# Patient Record
Sex: MALE | Race: WHITE | NOT HISPANIC OR LATINO | Employment: OTHER | URBAN - NONMETROPOLITAN AREA
[De-identification: names, ages, dates, MRNs, and addresses within clinical notes are randomized per-mention and may not be internally consistent; named-entity substitution may affect disease eponyms.]

---

## 2017-04-18 ENCOUNTER — APPOINTMENT (EMERGENCY)
Dept: RADIOLOGY | Facility: HOSPITAL | Age: 62
End: 2017-04-18
Payer: COMMERCIAL

## 2017-04-18 ENCOUNTER — HOSPITAL ENCOUNTER (EMERGENCY)
Facility: HOSPITAL | Age: 62
End: 2017-04-18
Attending: EMERGENCY MEDICINE | Admitting: EMERGENCY MEDICINE
Payer: COMMERCIAL

## 2017-04-18 ENCOUNTER — HOSPITAL ENCOUNTER (INPATIENT)
Facility: HOSPITAL | Age: 62
LOS: 2 days | Discharge: HOME | DRG: 101 | End: 2017-04-20
Attending: INTERNAL MEDICINE | Admitting: INTERNAL MEDICINE
Payer: COMMERCIAL

## 2017-04-18 ENCOUNTER — APPOINTMENT (EMERGENCY)
Dept: CT IMAGING | Facility: HOSPITAL | Age: 62
End: 2017-04-18
Payer: COMMERCIAL

## 2017-04-18 VITALS
DIASTOLIC BLOOD PRESSURE: 83 MMHG | TEMPERATURE: 100.3 F | RESPIRATION RATE: 18 BRPM | OXYGEN SATURATION: 97 % | WEIGHT: 241.18 LBS | HEART RATE: 82 BPM | SYSTOLIC BLOOD PRESSURE: 115 MMHG

## 2017-04-18 DIAGNOSIS — F10.10 ALCOHOL ABUSE: ICD-10-CM

## 2017-04-18 DIAGNOSIS — R56.9 SEIZURE (HCC): Primary | ICD-10-CM

## 2017-04-18 LAB
ALBUMIN SERPL BCP-MCNC: 3.8 G/DL (ref 3.5–5)
ALP SERPL-CCNC: 89 U/L (ref 46–116)
ALT SERPL W P-5'-P-CCNC: 40 U/L (ref 12–78)
AMPHETAMINES SERPL QL SCN: NEGATIVE
ANION GAP BLD CALC-SCNC: 18 MMOL/L (ref 4–13)
APAP SERPL-MCNC: <2 UG/ML (ref 10–30)
AST SERPL W P-5'-P-CCNC: 36 U/L (ref 5–45)
BACTERIA UR QL AUTO: ABNORMAL /HPF
BARBITURATES UR QL: NEGATIVE
BASE EX.OXY STD BLDV CALC-SCNC: 84.7 % (ref 60–80)
BASE EXCESS BLDV CALC-SCNC: -1.1 MMOL/L
BASOPHILS # BLD AUTO: 0.03 THOUSANDS/ΜL (ref 0–0.1)
BASOPHILS NFR BLD AUTO: 0 % (ref 0–1)
BENZODIAZ UR QL: POSITIVE
BILIRUB DIRECT SERPL-MCNC: 0.11 MG/DL (ref 0–0.2)
BILIRUB SERPL-MCNC: 0.53 MG/DL (ref 0.2–1)
BILIRUB UR QL STRIP: NEGATIVE
BUN BLD-MCNC: 9 MG/DL (ref 5–25)
CA-I BLD-SCNC: 1.11 MMOL/L (ref 1.12–1.32)
CHLORIDE BLD-SCNC: 104 MMOL/L (ref 100–108)
CLARITY UR: CLEAR
COCAINE UR QL: NEGATIVE
COLOR UR: YELLOW
CREAT BLD-MCNC: 0.9 MG/DL (ref 0.6–1.3)
EOSINOPHIL # BLD AUTO: 0.03 THOUSAND/ΜL (ref 0–0.61)
EOSINOPHIL NFR BLD AUTO: 0 % (ref 0–6)
ERYTHROCYTE [DISTWIDTH] IN BLOOD BY AUTOMATED COUNT: 13.7 % (ref 11.6–15.1)
ETHANOL SERPL-MCNC: <3 MG/DL (ref 0–3)
GFR SERPL CREATININE-BSD FRML MDRD: >60 ML/MIN/1.73SQ M
GLUCOSE SERPL-MCNC: 119 MG/DL (ref 65–140)
GLUCOSE UR STRIP-MCNC: NEGATIVE MG/DL
HCO3 BLDV-SCNC: 24.2 MMOL/L (ref 24–30)
HCT VFR BLD AUTO: 46.1 % (ref 36.5–49.3)
HCT VFR BLD CALC: 46 % (ref 36.5–49.3)
HGB BLD-MCNC: 15.5 G/DL (ref 12–17)
HGB BLDA-MCNC: 15.6 G/DL (ref 12–17)
HGB UR QL STRIP.AUTO: ABNORMAL
HOLD SPECIMEN: NORMAL
HOLD SPECIMEN: NORMAL
HYALINE CASTS #/AREA URNS LPF: ABNORMAL /LPF
INR PPP: 1.11 (ref 0.86–1.16)
KETONES UR STRIP-MCNC: NEGATIVE MG/DL
LEUKOCYTE ESTERASE UR QL STRIP: NEGATIVE
LIPASE SERPL-CCNC: 205 U/L (ref 73–393)
LYMPHOCYTES # BLD AUTO: 1.03 THOUSANDS/ΜL (ref 0.6–4.47)
LYMPHOCYTES NFR BLD AUTO: 12 % (ref 14–44)
Lab: NORMAL
MAGNESIUM SERPL-MCNC: 2.1 MG/DL (ref 1.6–2.6)
MCH RBC QN AUTO: 29.8 PG (ref 26.8–34.3)
MCHC RBC AUTO-ENTMCNC: 33.6 G/DL (ref 31.4–37.4)
MCV RBC AUTO: 89 FL (ref 82–98)
METHADONE UR QL: NEGATIVE
MONOCYTES # BLD AUTO: 0.66 THOUSAND/ΜL (ref 0.17–1.22)
MONOCYTES NFR BLD AUTO: 8 % (ref 4–12)
NEUTROPHILS # BLD AUTO: 6.53 THOUSANDS/ΜL (ref 1.85–7.62)
NEUTS SEG NFR BLD AUTO: 80 % (ref 43–75)
NITRITE UR QL STRIP: NEGATIVE
NON-SQ EPI CELLS URNS QL MICRO: ABNORMAL /HPF
O2 CT BLDV-SCNC: 18.8 ML/DL
OPIATES UR QL SCN: NEGATIVE
PCO2 BLD: 22 MMOL/L (ref 21–32)
PCO2 BLDV: 42.3 MM HG (ref 42–50)
PCP UR QL: NEGATIVE
PH BLDV: 7.38 [PH] (ref 7.3–7.4)
PH UR STRIP.AUTO: 6.5 [PH] (ref 4.5–8)
PLATELET # BLD AUTO: 344 THOUSANDS/UL (ref 149–390)
PMV BLD AUTO: 8.8 FL (ref 8.9–12.7)
PO2 BLDV: 50.7 MM HG (ref 35–45)
POTASSIUM BLD-SCNC: 4.1 MMOL/L (ref 3.5–5.3)
PROT SERPL-MCNC: 7 G/DL (ref 6.4–8.2)
PROT UR STRIP-MCNC: ABNORMAL MG/DL
PROTHROMBIN TIME: 14 SECONDS (ref 12–14.3)
RBC # BLD AUTO: 5.21 MILLION/UL (ref 3.88–5.62)
RBC #/AREA URNS AUTO: ABNORMAL /HPF
SALICYLATES SERPL-MCNC: <3 MG/DL (ref 3–20)
SODIUM BLD-SCNC: 138 MMOL/L (ref 136–145)
SP GR UR STRIP.AUTO: 1.01 (ref 1–1.03)
SPECIMEN SOURCE: ABNORMAL
T4 FREE SERPL-MCNC: 0.74 NG/DL (ref 0.76–1.46)
THC UR QL: NEGATIVE
TSH SERPL DL<=0.05 MIU/L-ACNC: 0.71 UIU/ML (ref 0.36–3.74)
UROBILINOGEN UR QL STRIP.AUTO: 0.2 E.U./DL
WBC # BLD AUTO: 8.28 THOUSAND/UL (ref 4.31–10.16)
WBC #/AREA URNS AUTO: ABNORMAL /HPF

## 2017-04-18 PROCEDURE — 96366 THER/PROPH/DIAG IV INF ADDON: CPT

## 2017-04-18 PROCEDURE — 72125 CT NECK SPINE W/O DYE: CPT

## 2017-04-18 PROCEDURE — 84443 ASSAY THYROID STIM HORMONE: CPT | Performed by: EMERGENCY MEDICINE

## 2017-04-18 PROCEDURE — 82805 BLOOD GASES W/O2 SATURATION: CPT | Performed by: EMERGENCY MEDICINE

## 2017-04-18 PROCEDURE — 73140 X-RAY EXAM OF FINGER(S): CPT

## 2017-04-18 PROCEDURE — 71260 CT THORAX DX C+: CPT

## 2017-04-18 PROCEDURE — 83690 ASSAY OF LIPASE: CPT | Performed by: EMERGENCY MEDICINE

## 2017-04-18 PROCEDURE — 80329 ANALGESICS NON-OPIOID 1 OR 2: CPT | Performed by: EMERGENCY MEDICINE

## 2017-04-18 PROCEDURE — 70450 CT HEAD/BRAIN W/O DYE: CPT

## 2017-04-18 PROCEDURE — 90715 TDAP VACCINE 7 YRS/> IM: CPT | Performed by: EMERGENCY MEDICINE

## 2017-04-18 PROCEDURE — 90471 IMMUNIZATION ADMIN: CPT

## 2017-04-18 PROCEDURE — 85025 COMPLETE CBC W/AUTO DIFF WBC: CPT | Performed by: EMERGENCY MEDICINE

## 2017-04-18 PROCEDURE — 80047 BASIC METABLC PNL IONIZED CA: CPT

## 2017-04-18 PROCEDURE — 96365 THER/PROPH/DIAG IV INF INIT: CPT

## 2017-04-18 PROCEDURE — 80076 HEPATIC FUNCTION PANEL: CPT | Performed by: EMERGENCY MEDICINE

## 2017-04-18 PROCEDURE — 80307 DRUG TEST PRSMV CHEM ANLYZR: CPT | Performed by: EMERGENCY MEDICINE

## 2017-04-18 PROCEDURE — 99285 EMERGENCY DEPT VISIT HI MDM: CPT

## 2017-04-18 PROCEDURE — 83735 ASSAY OF MAGNESIUM: CPT | Performed by: EMERGENCY MEDICINE

## 2017-04-18 PROCEDURE — 85610 PROTHROMBIN TIME: CPT | Performed by: EMERGENCY MEDICINE

## 2017-04-18 PROCEDURE — 80320 DRUG SCREEN QUANTALCOHOLS: CPT | Performed by: EMERGENCY MEDICINE

## 2017-04-18 PROCEDURE — 87086 URINE CULTURE/COLONY COUNT: CPT | Performed by: EMERGENCY MEDICINE

## 2017-04-18 PROCEDURE — 96375 TX/PRO/DX INJ NEW DRUG ADDON: CPT

## 2017-04-18 PROCEDURE — 74177 CT ABD & PELVIS W/CONTRAST: CPT

## 2017-04-18 PROCEDURE — 36415 COLL VENOUS BLD VENIPUNCTURE: CPT | Performed by: EMERGENCY MEDICINE

## 2017-04-18 PROCEDURE — 81001 URINALYSIS AUTO W/SCOPE: CPT | Performed by: EMERGENCY MEDICINE

## 2017-04-18 PROCEDURE — 73080 X-RAY EXAM OF ELBOW: CPT

## 2017-04-18 PROCEDURE — 85014 HEMATOCRIT: CPT

## 2017-04-18 PROCEDURE — 93005 ELECTROCARDIOGRAM TRACING: CPT | Performed by: EMERGENCY MEDICINE

## 2017-04-18 PROCEDURE — 84439 ASSAY OF FREE THYROXINE: CPT | Performed by: EMERGENCY MEDICINE

## 2017-04-18 RX ORDER — LORAZEPAM 2 MG/ML
2 INJECTION INTRAMUSCULAR ONCE
Status: COMPLETED | OUTPATIENT
Start: 2017-04-18 | End: 2017-04-18

## 2017-04-18 RX ORDER — LORAZEPAM 2 MG/ML
INJECTION INTRAMUSCULAR
Status: COMPLETED
Start: 2017-04-18 | End: 2017-04-18

## 2017-04-18 RX ORDER — LEVETIRACETAM 500 MG/5ML
INJECTION, SOLUTION, CONCENTRATE INTRAVENOUS
Status: DISCONTINUED
Start: 2017-04-18 | End: 2017-04-18 | Stop reason: WASHOUT

## 2017-04-18 RX ORDER — DIAZEPAM 5 MG/ML
20 INJECTION, SOLUTION INTRAMUSCULAR; INTRAVENOUS ONCE
Status: COMPLETED | OUTPATIENT
Start: 2017-04-18 | End: 2017-04-18

## 2017-04-18 RX ORDER — CEFTRIAXONE SODIUM 2 G/50ML
2000 INJECTION, SOLUTION INTRAVENOUS ONCE
Status: COMPLETED | OUTPATIENT
Start: 2017-04-18 | End: 2017-04-18

## 2017-04-18 RX ADMIN — LORAZEPAM 2 MG: 2 INJECTION, SOLUTION INTRAMUSCULAR; INTRAVENOUS at 17:23

## 2017-04-18 RX ADMIN — LORAZEPAM 2 MG: 2 INJECTION INTRAMUSCULAR at 17:23

## 2017-04-18 RX ADMIN — IOHEXOL 100 ML: 350 INJECTION, SOLUTION INTRAVENOUS at 16:55

## 2017-04-18 RX ADMIN — CEFTRIAXONE SODIUM 2000 MG: 2 INJECTION, SOLUTION INTRAVENOUS at 17:29

## 2017-04-18 RX ADMIN — LEVETIRACETAM 1000 MG: 500 INJECTION, SOLUTION, CONCENTRATE INTRAVENOUS at 18:02

## 2017-04-18 RX ADMIN — TETANUS TOXOID, REDUCED DIPHTHERIA TOXOID AND ACELLULAR PERTUSSIS VACCINE, ADSORBED 0.5 ML: 5; 2.5; 8; 8; 2.5 SUSPENSION INTRAMUSCULAR at 17:17

## 2017-04-18 RX ADMIN — DIAZEPAM 20 MG: 5 INJECTION, SOLUTION INTRAMUSCULAR; INTRAVENOUS at 17:54

## 2017-04-18 RX ADMIN — ACYCLOVIR SODIUM 1100 MG: 50 INJECTION, SOLUTION INTRAVENOUS at 18:46

## 2017-04-19 ENCOUNTER — GENERIC CONVERSION - ENCOUNTER (OUTPATIENT)
Dept: OTHER | Facility: OTHER | Age: 62
End: 2017-04-19

## 2017-04-19 ENCOUNTER — APPOINTMENT (INPATIENT)
Dept: RADIOLOGY | Facility: HOSPITAL | Age: 62
DRG: 101 | End: 2017-04-19
Payer: COMMERCIAL

## 2017-04-19 ENCOUNTER — APPOINTMENT (INPATIENT)
Dept: NEUROLOGY | Facility: AMBULATORY SURGERY CENTER | Age: 62
DRG: 101 | End: 2017-04-19
Payer: COMMERCIAL

## 2017-04-19 PROBLEM — R51.9 ACUTE NONINTRACTABLE HEADACHE: Status: ACTIVE | Noted: 2017-04-19

## 2017-04-19 PROBLEM — M25.512 ACUTE PAIN OF LEFT SHOULDER: Status: ACTIVE | Noted: 2017-04-19

## 2017-04-19 PROBLEM — R56.9 SEIZURE (HCC): Status: ACTIVE | Noted: 2017-04-19

## 2017-04-19 PROBLEM — F10.10 ALCOHOL ABUSE: Status: ACTIVE | Noted: 2017-04-19
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
## 2017-04-19 LAB
ANION GAP SERPL CALCULATED.3IONS-SCNC: 9 MMOL/L (ref 4–13)
BUN SERPL-MCNC: 9 MG/DL (ref 5–25)
CALCIUM SERPL-MCNC: 8 MG/DL (ref 8.3–10.1)
CHLORIDE SERPL-SCNC: 105 MMOL/L (ref 100–108)
CO2 SERPL-SCNC: 24 MMOL/L (ref 21–32)
CREAT SERPL-MCNC: 0.88 MG/DL (ref 0.6–1.3)
ERYTHROCYTE [DISTWIDTH] IN BLOOD BY AUTOMATED COUNT: 13.8 % (ref 11.6–15.1)
GFR SERPL CREATININE-BSD FRML MDRD: >60 ML/MIN/1.73SQ M
GLUCOSE SERPL-MCNC: 99 MG/DL (ref 65–140)
HCT VFR BLD AUTO: 42.8 % (ref 36.5–49.3)
HGB BLD-MCNC: 14.5 G/DL (ref 12–17)
MCH RBC QN AUTO: 30.1 PG (ref 26.8–34.3)
MCHC RBC AUTO-ENTMCNC: 33.9 G/DL (ref 31.4–37.4)
MCV RBC AUTO: 89 FL (ref 82–98)
PLATELET # BLD AUTO: 307 THOUSANDS/UL (ref 149–390)
PMV BLD AUTO: 9.4 FL (ref 8.9–12.7)
POTASSIUM SERPL-SCNC: 3.5 MMOL/L (ref 3.5–5.3)
RBC # BLD AUTO: 4.82 MILLION/UL (ref 3.88–5.62)
SODIUM SERPL-SCNC: 138 MMOL/L (ref 136–145)
WBC # BLD AUTO: 8.98 THOUSAND/UL (ref 4.31–10.16)

## 2017-04-19 PROCEDURE — 70553 MRI BRAIN STEM W/O & W/DYE: CPT

## 2017-04-19 PROCEDURE — 80048 BASIC METABOLIC PNL TOTAL CA: CPT | Performed by: INTERNAL MEDICINE

## 2017-04-19 PROCEDURE — 95816 EEG AWAKE AND DROWSY: CPT

## 2017-04-19 PROCEDURE — 85027 COMPLETE CBC AUTOMATED: CPT | Performed by: INTERNAL MEDICINE

## 2017-04-19 PROCEDURE — A9585 GADOBUTROL INJECTION: HCPCS | Performed by: INTERNAL MEDICINE

## 2017-04-19 PROCEDURE — 73030 X-RAY EXAM OF SHOULDER: CPT

## 2017-04-19 RX ORDER — OXYCODONE HYDROCHLORIDE 5 MG/1
5 TABLET ORAL EVERY 4 HOURS PRN
Status: DISCONTINUED | OUTPATIENT
Start: 2017-04-19 | End: 2017-04-20 | Stop reason: HOSPADM

## 2017-04-19 RX ORDER — FOLIC ACID 1 MG/1
1 TABLET ORAL DAILY
Status: DISCONTINUED | OUTPATIENT
Start: 2017-04-19 | End: 2017-04-20 | Stop reason: HOSPADM

## 2017-04-19 RX ORDER — LORAZEPAM 2 MG/ML
1 INJECTION INTRAMUSCULAR EVERY 4 HOURS PRN
Status: DISCONTINUED | OUTPATIENT
Start: 2017-04-19 | End: 2017-04-20 | Stop reason: HOSPADM

## 2017-04-19 RX ORDER — THIAMINE MONONITRATE (VIT B1) 100 MG
100 TABLET ORAL DAILY
Status: DISCONTINUED | OUTPATIENT
Start: 2017-04-19 | End: 2017-04-20 | Stop reason: HOSPADM

## 2017-04-19 RX ORDER — ONDANSETRON 2 MG/ML
4 INJECTION INTRAMUSCULAR; INTRAVENOUS EVERY 6 HOURS PRN
Status: DISCONTINUED | OUTPATIENT
Start: 2017-04-19 | End: 2017-04-20 | Stop reason: HOSPADM

## 2017-04-19 RX ADMIN — OXYCODONE HYDROCHLORIDE 5 MG: 5 TABLET ORAL at 01:49

## 2017-04-19 RX ADMIN — FOLIC ACID 1 MG: 1 TABLET ORAL at 08:32

## 2017-04-19 RX ADMIN — ENOXAPARIN SODIUM 40 MG: 40 INJECTION SUBCUTANEOUS at 08:32

## 2017-04-19 RX ADMIN — OXYCODONE HYDROCHLORIDE 5 MG: 5 TABLET ORAL at 23:43

## 2017-04-19 RX ADMIN — OXYCODONE HYDROCHLORIDE 5 MG: 5 TABLET ORAL at 16:55

## 2017-04-19 RX ADMIN — OXYCODONE HYDROCHLORIDE 5 MG: 5 TABLET ORAL at 08:32

## 2017-04-19 RX ADMIN — Medication 100 MG: at 08:33

## 2017-04-19 RX ADMIN — GADOBUTROL 10 ML: 604.72 INJECTION INTRAVENOUS at 23:06

## 2017-04-20 VITALS
OXYGEN SATURATION: 99 % | SYSTOLIC BLOOD PRESSURE: 150 MMHG | DIASTOLIC BLOOD PRESSURE: 69 MMHG | BODY MASS INDEX: 32.67 KG/M2 | WEIGHT: 241.18 LBS | HEART RATE: 82 BPM | HEIGHT: 72 IN | RESPIRATION RATE: 20 BRPM | TEMPERATURE: 97.3 F

## 2017-04-20 PROBLEM — R51.9 ACUTE NONINTRACTABLE HEADACHE: Status: RESOLVED | Noted: 2017-04-19 | Resolved: 2017-04-20

## 2017-04-20 LAB
ATRIAL RATE: 90 BPM
BACTERIA UR CULT: NORMAL
P AXIS: 34 DEGREES
PR INTERVAL: 160 MS
QRS AXIS: 4 DEGREES
QRSD INTERVAL: 92 MS
QT INTERVAL: 376 MS
QTC INTERVAL: 459 MS
T WAVE AXIS: 40 DEGREES
VENTRICULAR RATE: 90 BPM

## 2017-04-20 RX ADMIN — Medication 100 MG: at 09:14

## 2017-04-20 RX ADMIN — FOLIC ACID 1 MG: 1 TABLET ORAL at 09:14

## 2017-04-20 RX ADMIN — ENOXAPARIN SODIUM 40 MG: 40 INJECTION SUBCUTANEOUS at 09:14

## 2017-04-27 ENCOUNTER — ALLSCRIPTS OFFICE VISIT (OUTPATIENT)
Dept: OTHER | Facility: OTHER | Age: 62
End: 2017-04-27

## 2017-04-27 DIAGNOSIS — G40.909 EPILEPSY WITHOUT STATUS EPILEPTICUS, NOT INTRACTABLE (HCC): ICD-10-CM

## 2017-05-03 ENCOUNTER — APPOINTMENT (OUTPATIENT)
Dept: LAB | Facility: MEDICAL CENTER | Age: 62
End: 2017-05-03
Payer: COMMERCIAL

## 2017-05-03 ENCOUNTER — TRANSCRIBE ORDERS (OUTPATIENT)
Dept: LAB | Facility: MEDICAL CENTER | Age: 62
End: 2017-05-03

## 2017-05-03 DIAGNOSIS — G40.909 EPILEPSY WITHOUT STATUS EPILEPTICUS, NOT INTRACTABLE (HCC): ICD-10-CM

## 2017-05-03 LAB
ANION GAP SERPL CALCULATED.3IONS-SCNC: 9 MMOL/L (ref 4–13)
BUN SERPL-MCNC: 11 MG/DL (ref 5–25)
CALCIUM SERPL-MCNC: 9.3 MG/DL (ref 8.3–10.1)
CHLORIDE SERPL-SCNC: 104 MMOL/L (ref 100–108)
CO2 SERPL-SCNC: 24 MMOL/L (ref 21–32)
CREAT SERPL-MCNC: 0.96 MG/DL (ref 0.6–1.3)
GFR SERPL CREATININE-BSD FRML MDRD: >60 ML/MIN/1.73SQ M
GLUCOSE P FAST SERPL-MCNC: 83 MG/DL (ref 65–99)
MAGNESIUM SERPL-MCNC: 2.4 MG/DL (ref 1.6–2.6)
POTASSIUM SERPL-SCNC: 4 MMOL/L (ref 3.5–5.3)
SODIUM SERPL-SCNC: 137 MMOL/L (ref 136–145)

## 2017-05-03 PROCEDURE — 80048 BASIC METABOLIC PNL TOTAL CA: CPT

## 2017-05-03 PROCEDURE — 36415 COLL VENOUS BLD VENIPUNCTURE: CPT

## 2017-05-03 PROCEDURE — 83735 ASSAY OF MAGNESIUM: CPT

## 2017-05-05 ENCOUNTER — GENERIC CONVERSION - ENCOUNTER (OUTPATIENT)
Dept: OTHER | Facility: OTHER | Age: 62
End: 2017-05-05

## 2017-05-11 ENCOUNTER — ALLSCRIPTS OFFICE VISIT (OUTPATIENT)
Dept: OTHER | Facility: OTHER | Age: 62
End: 2017-05-11

## 2017-05-19 ENCOUNTER — TRANSCRIBE ORDERS (OUTPATIENT)
Dept: LAB | Facility: MEDICAL CENTER | Age: 62
End: 2017-05-19

## 2017-05-19 ENCOUNTER — APPOINTMENT (OUTPATIENT)
Dept: LAB | Facility: MEDICAL CENTER | Age: 62
End: 2017-05-19
Payer: COMMERCIAL

## 2017-05-19 DIAGNOSIS — G40.909 EPILEPSY WITHOUT STATUS EPILEPTICUS, NOT INTRACTABLE (HCC): ICD-10-CM

## 2017-05-19 LAB
ANION GAP SERPL CALCULATED.3IONS-SCNC: 6 MMOL/L (ref 4–13)
BUN SERPL-MCNC: 11 MG/DL (ref 5–25)
CALCIUM SERPL-MCNC: 8.8 MG/DL (ref 8.3–10.1)
CHLORIDE SERPL-SCNC: 106 MMOL/L (ref 100–108)
CO2 SERPL-SCNC: 24 MMOL/L (ref 21–32)
CREAT SERPL-MCNC: 0.84 MG/DL (ref 0.6–1.3)
GFR SERPL CREATININE-BSD FRML MDRD: >60 ML/MIN/1.73SQ M
GLUCOSE P FAST SERPL-MCNC: 92 MG/DL (ref 65–99)
POTASSIUM SERPL-SCNC: 3.9 MMOL/L (ref 3.5–5.3)
SODIUM SERPL-SCNC: 136 MMOL/L (ref 136–145)

## 2017-05-19 PROCEDURE — 80177 DRUG SCRN QUAN LEVETIRACETAM: CPT

## 2017-05-19 PROCEDURE — 80048 BASIC METABOLIC PNL TOTAL CA: CPT

## 2017-05-19 PROCEDURE — 36415 COLL VENOUS BLD VENIPUNCTURE: CPT

## 2017-05-22 ENCOUNTER — GENERIC CONVERSION - ENCOUNTER (OUTPATIENT)
Dept: OTHER | Facility: OTHER | Age: 62
End: 2017-05-22

## 2017-05-22 LAB — LEVETIRACETAM SERPL-MCNC: 5.3 UG/ML (ref 10–40)

## 2017-05-31 ENCOUNTER — TRANSCRIBE ORDERS (OUTPATIENT)
Dept: ADMINISTRATIVE | Facility: HOSPITAL | Age: 62
End: 2017-05-31

## 2017-05-31 ENCOUNTER — ALLSCRIPTS OFFICE VISIT (OUTPATIENT)
Dept: OTHER | Facility: OTHER | Age: 62
End: 2017-05-31

## 2017-05-31 DIAGNOSIS — G40.909 NONINTRACTABLE EPILEPSY WITHOUT STATUS EPILEPTICUS, UNSPECIFIED EPILEPSY TYPE (HCC): Primary | ICD-10-CM

## 2017-06-14 ENCOUNTER — HOSPITAL ENCOUNTER (OUTPATIENT)
Dept: NEUROLOGY | Facility: HOSPITAL | Age: 62
Discharge: HOME | End: 2017-06-14
Payer: COMMERCIAL

## 2017-06-14 ENCOUNTER — GENERIC CONVERSION - ENCOUNTER (OUTPATIENT)
Dept: OTHER | Facility: OTHER | Age: 62
End: 2017-06-14

## 2017-06-14 DIAGNOSIS — G40.909 NONINTRACTABLE EPILEPSY WITHOUT STATUS EPILEPTICUS, UNSPECIFIED EPILEPSY TYPE (HCC): ICD-10-CM

## 2017-06-14 PROCEDURE — 95819 EEG AWAKE AND ASLEEP: CPT

## 2017-09-12 ENCOUNTER — ALLSCRIPTS OFFICE VISIT (OUTPATIENT)
Dept: OTHER | Facility: OTHER | Age: 62
End: 2017-09-12
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
## 2018-01-12 VITALS
BODY MASS INDEX: 31.32 KG/M2 | HEIGHT: 74 IN | HEART RATE: 84 BPM | SYSTOLIC BLOOD PRESSURE: 186 MMHG | WEIGHT: 244 LBS | OXYGEN SATURATION: 98 % | RESPIRATION RATE: 19 BRPM | DIASTOLIC BLOOD PRESSURE: 106 MMHG

## 2018-01-12 NOTE — RESULT NOTES
Verified Results  (1) BASIC METABOLIC PROFILE [id number] 10:35AM Lashae Mensah Order Number: [id number]     Test Name Result Flag Reference   SODIUM 136 mmol/L  136-145   POTASSIUM 3 9 mmol/L  3 5-5 3   CHLORIDE 106 mmol/L  100-108   CARBON DIOXIDE 24 mmol/L  21-32   ANION GAP (CALC) 6 mmol/L  4-13   BLOOD UREA NITROGEN 11 mg/dL  5-25   CREATININE 0 84 mg/dL  0 60-1 30   Standardized to IDMS reference method   CALCIUM 8 8 mg/dL  8 3-10 1   eGFR Non-African American      >60 0 ml/min/1 73sq [facility] Energy Disease Education Program recommendations are as follows:  GFR calculation is accurate only with a steady state creatinine  Chronic Kidney disease less than 60 ml/min/1 73 sq  meters  Kidney failure less than 15 ml/min/1 73 sq  meters     GLUCOSE FASTING 92 mg/dL  65-99

## 2018-01-12 NOTE — PROCEDURES
Procedures by Queenie Rosenbaum MD at 2017   2:19 PM      Author:  Queenie Rosenbaum MD Service:  Neurology Author Type:  Physician    Filed:  2017  2:24 PM Date of Service:  2017  2:19 PM Status:  Signed    :  Queenie Rosenbaum MD (Physician)         ELECTROENCEPHALOGRAM (EEG)      Patient Name:  Jareth Arauz  MRN: [id number]   :  1955 File #: Homero 16 - 12   Age: 62 y o  Encounter #: [id number]   Date performed: 2017            Report date: 2017          Study type: Routine, sleep deprived EEG    ICD 10 diagnosis: Spells/Fit NOS R56 9    Start time: 09:07 End time: 09:42     -------------------------------------------------------------------------------------------------------------------   Patient History: This recording was observed in a 62 y o  male with a single seizure to determine risk of epilepsy  Medications include: Keppra    -------------------------------------------------------------------------------------------------------------------   Description of Procedure:  ·  32 channel digital recording with electrodes placed according to the International 10-20 system with additional  T1/T2 electrodes, EOG, EKG, and simultaneous  video  The recording was technically satisfactory  -------------------------------------------------------------------------------------------------------------------   EEG Description:    The recording was performed with the patient awake, drowsy, and asleep  He was fully oriented  During wakefulness, there were brief runs of well regulated, low amplitude, posteriorly  dominant, symmetric 12 cps alpha rhythm that was not sustained long enough to determine reactivity to eye opening  There were symmetric low  amplitude, frontally dominant beta activities  With drowsiness, alpha activity attenuated and was replaced by diffusely distributed theta activities  With sleep, symmetric vertex sharp waves and sleep spindles were present  -------------------------------------------------------------------------------------------------------------------   Activation Procedures:  Hyperventilation was performed for 3-4  minutes and did not produce any changes  Stepped photic stimulation between 1-20 cps was performed and induced symmetric  photic driving  Other findings: The single lead ECG demonstrated normal sinus rhythm    -------------------------------------------------------------------------------------------------------------------   EEG Interpretation: This Routine, sleep deprived EEG recorded during wakefulness, drowsiness, and sleep is normal     Connie Tesfaye MD   [address] [facility]               Received for:Gen LUBIN    Jun 14 2017  2:24PM [facility] Standard Time
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
## 2018-01-13 VITALS
OXYGEN SATURATION: 96 % | SYSTOLIC BLOOD PRESSURE: 168 MMHG | DIASTOLIC BLOOD PRESSURE: 86 MMHG | WEIGHT: 240.75 LBS | RESPIRATION RATE: 18 BRPM | BODY MASS INDEX: 30.91 KG/M2 | HEART RATE: 75 BPM

## 2018-01-14 VITALS
WEIGHT: 243 LBS | HEIGHT: 74 IN | BODY MASS INDEX: 31.18 KG/M2 | SYSTOLIC BLOOD PRESSURE: 136 MMHG | DIASTOLIC BLOOD PRESSURE: 88 MMHG

## 2018-01-14 VITALS
SYSTOLIC BLOOD PRESSURE: 138 MMHG | BODY MASS INDEX: 31.44 KG/M2 | WEIGHT: 245 LBS | HEIGHT: 74 IN | DIASTOLIC BLOOD PRESSURE: 106 MMHG

## 2018-01-15 NOTE — RESULT NOTES
Verified Results  (1) MAGNESIUM 52EJG4559 10:47AM Therese Coughlin     Test Name Result Flag Reference   MAGNESIUM 2 4 mg/dL  1 6-2 6     (1) BASIC METABOLIC PROFILE 10LPW1636 10:47AM Therese Coughlin     Test Name Result Flag Reference   SODIUM 137 mmol/L  136-145   POTASSIUM 4 0 mmol/L  3 5-5 3   CHLORIDE 104 mmol/L  100-108   CARBON DIOXIDE 24 mmol/L  21-32   ANION GAP (CALC) 9 mmol/L  4-13   BLOOD UREA NITROGEN 11 mg/dL  5-25   CREATININE 0 96 mg/dL  0 60-1 30   Standardized to IDMS reference method   CALCIUM 9 3 mg/dL  8 3-10 1   eGFR Non-African American      >60 0 ml/min/1 73sq [facility] Energy Disease Education Program recommendations are as follows:  GFR calculation is accurate only with a steady state creatinine  Chronic Kidney disease less than 60 ml/min/1 73 sq  meters  Kidney failure less than 15 ml/min/1 73 sq  meters     GLUCOSE FASTING 83 mg/dL  65-99
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
## 2018-01-18 NOTE — RESULT NOTES
Verified Results  (1) LEVETIRACETAM [facility]) 20KIX9450 10:35AM Edgardo Commander Order Number: [id number]     Test Name Result Flag Reference   LEVETIRACETAM (KEPPRA) 5 3 ug/mL L 10 0 - 40 0   Performed at:  [address]  [id number]  : Vale Falk MD, Phone:  [phone]

## 2018-01-30 ENCOUNTER — TELEPHONE (OUTPATIENT)
Dept: FAMILY MEDICINE CLINIC | Facility: CLINIC | Age: 63
End: 2018-01-30

## 2018-02-12 ENCOUNTER — OFFICE VISIT (OUTPATIENT)
Dept: NEUROLOGY | Facility: CLINIC | Age: 63
End: 2018-02-12
Payer: COMMERCIAL

## 2018-02-12 VITALS
BODY MASS INDEX: 32.74 KG/M2 | HEIGHT: 73 IN | SYSTOLIC BLOOD PRESSURE: 151 MMHG | HEART RATE: 81 BPM | DIASTOLIC BLOOD PRESSURE: 92 MMHG | RESPIRATION RATE: 18 BRPM | WEIGHT: 247 LBS

## 2018-02-12 DIAGNOSIS — R56.9 SEIZURE (HCC): Primary | ICD-10-CM

## 2018-02-12 PROCEDURE — 99214 OFFICE O/P EST MOD 30 MIN: CPT | Performed by: PSYCHIATRY & NEUROLOGY

## 2018-02-12 RX ORDER — LEVETIRACETAM 500 MG/1
500 TABLET ORAL 2 TIMES DAILY
Qty: 60 TABLET | Refills: 11 | Status: SHIPPED | OUTPATIENT
Start: 2018-02-12 | End: 2018-12-10 | Stop reason: SDUPTHER

## 2018-02-12 RX ORDER — LEVETIRACETAM 500 MG/1
1 TABLET ORAL 2 TIMES DAILY
COMMUNITY
Start: 2017-05-04 | End: 2018-02-12 | Stop reason: SDUPTHER

## 2018-02-12 NOTE — PROGRESS NOTES
Patient ID: Mariana Newell is a 65 y o  male with hypertension, hyperlipidemia, and a single seizure who is returning to Neurology office for follow up of his seizure  Assessment/Plan:    Seizure ([address] )  He continues to tolerate Keppra well without side effects  The episode that he had shortly after the [place] Bowl would raise the possibility of simple partial seizure, as he felt unwell similar to what he experienced before his larger seizure in the past   This more recent episode, did not progress to have any alteration of consciousness, a simply felt unwell  It was unclear that this was definitely a seizure, we would suggest that he should continue to be on Keppra going forward  - I again discussed proper dosing Keppra, and recommend that he continue to take 500 mg twice a day  If he would have any additional episodes while taking this medication consistently, his dose may need to be increased  I spent a total of 25 min with the patient with greater than 50% of that time spent counseling and coordinating his care, specifically discussing his diagnosis, medications, and proper dosing, as noted above  Subjective:    HPI  Current medications:   1  Keppra 500 mg twice a day  I last saw him in the office on 9/12/2017  At that time, he was doing well without any recurrent seizures, it was tolerating Keppra well without side effects  He was taking a lower been prescribed dose, only taking Keppra once a day, so she was instructed to start taking it twice a day to get to a more therapeutic dose  Since his last visit, he has continued to do well  Around the [employer], he had recently lost a close friend, and was very upset  He reports that he did not take his medications per day, the following day had an episode where he felt very off, feeling shaky and unwell  He continued to be awake and interactive, in no point losing consciousness or having any alteration of consciousness    He was able to get home, and did not have any larger event  He has since restarted taking his Keppra, and continues to tolerate it well without side effects  The following portions of the patient's history were reviewed and updated as appropriate: allergies, current medications and problem list      Objective:    Blood pressure 151/92, pulse 81, resp  rate 18, height 6' 1" (1 854 m), weight 112 kg (247 lb)  Physical Exam    Neurological Exam      ROS:    Review of Systems   Constitutional: Positive for appetite change  Negative for fever  HENT: Negative  Negative for hearing loss, tinnitus, trouble swallowing and voice change  Ringing in ears  Eyes: Negative  Negative for photophobia and pain  Respiratory: Negative  Negative for chest tightness and shortness of breath  Cardiovascular: Negative  Negative for chest pain and palpitations  Gastrointestinal: Negative  Negative for abdominal pain, nausea and vomiting  Endocrine: Negative  Negative for cold intolerance and heat intolerance  Genitourinary: Negative  Negative for dysuria, frequency and urgency  Musculoskeletal: Negative  Negative for myalgias and neck pain  Skin: Negative  Negative for rash  Neurological: Negative  Negative for dizziness, tremors, seizures, syncope, facial asymmetry, speech difficulty, weakness, light-headedness, numbness and headaches  Hematological: Negative  Does not bruise/bleed easily  Psychiatric/Behavioral: Negative  Negative for confusion, hallucinations and sleep disturbance

## 2018-02-12 NOTE — LETTER
2018     To whom it may concern,     Evelin Christianson ( 1955) is under my medical care  He is able to return to work and does not have any driving restrictions at this time       Sincerely,     Dell Chaidez MD

## 2018-02-12 NOTE — ASSESSMENT & PLAN NOTE
He continues to tolerate Keppra well without side effects  The episode that he had shortly after the Super Bowl would raise the possibility of simple partial seizure, as he felt unwell similar to what he experienced before his larger seizure in the past   This more recent episode, did not progress to have any alteration of consciousness, a simply felt unwell  It was unclear that this was definitely a seizure, we would suggest that he should continue to be on Keppra going forward  - I again discussed proper dosing Keppra, and recommend that he continue to take 500 mg twice a day  If he would have any additional episodes while taking this medication consistently, his dose may need to be increased

## 2018-02-12 NOTE — PATIENT INSTRUCTIONS
-- Continue to take Keppra 500 mg twice a day  If you have any additional episodes, please give us a call, since we may need to increase the dose of Keppra

## 2018-05-11 ENCOUNTER — APPOINTMENT (OUTPATIENT)
Dept: RADIOLOGY | Facility: MEDICAL CENTER | Age: 63
End: 2018-05-11
Payer: COMMERCIAL

## 2018-05-11 ENCOUNTER — TRANSCRIBE ORDERS (OUTPATIENT)
Dept: ADMINISTRATIVE | Facility: HOSPITAL | Age: 63
End: 2018-05-11

## 2018-05-11 ENCOUNTER — OFFICE VISIT (OUTPATIENT)
Dept: FAMILY MEDICINE CLINIC | Facility: CLINIC | Age: 63
End: 2018-05-11
Payer: COMMERCIAL

## 2018-05-11 VITALS
WEIGHT: 242.4 LBS | DIASTOLIC BLOOD PRESSURE: 80 MMHG | HEIGHT: 73 IN | SYSTOLIC BLOOD PRESSURE: 124 MMHG | BODY MASS INDEX: 32.13 KG/M2

## 2018-05-11 DIAGNOSIS — S50.01XA CONTUSION OF RIGHT ELBOW, INITIAL ENCOUNTER: ICD-10-CM
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
## 2018-05-11 DIAGNOSIS — M25.421 ELBOW SWELLING, RIGHT: Primary | ICD-10-CM
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
## 2018-05-11 DIAGNOSIS — S60.212A CONTUSION OF LEFT WRIST, INITIAL ENCOUNTER: ICD-10-CM

## 2018-05-11 LAB — CRYSTALS SNV QL MICRO: NORMAL

## 2018-05-11 PROCEDURE — 73110 X-RAY EXAM OF WRIST: CPT

## 2018-05-11 PROCEDURE — 3008F BODY MASS INDEX DOCD: CPT | Performed by: FAMILY MEDICINE

## 2018-05-11 PROCEDURE — 99214 OFFICE O/P EST MOD 30 MIN: CPT | Performed by: FAMILY MEDICINE

## 2018-05-11 PROCEDURE — 89060 EXAM SYNOVIAL FLUID CRYSTALS: CPT | Performed by: FAMILY MEDICINE

## 2018-05-11 PROCEDURE — 73080 X-RAY EXAM OF ELBOW: CPT

## 2018-05-11 PROCEDURE — 20605 DRAIN/INJ JOINT/BURSA W/O US: CPT | Performed by: FAMILY MEDICINE

## 2018-05-11 RX ORDER — LIDOCAINE HYDROCHLORIDE 10 MG/ML
0.5 INJECTION, SOLUTION INFILTRATION; PERINEURAL
Status: COMPLETED | OUTPATIENT
Start: 2018-05-11 | End: 2018-05-11

## 2018-05-11 RX ADMIN — LIDOCAINE HYDROCHLORIDE 0.5 ML: 10 INJECTION, SOLUTION INFILTRATION; PERINEURAL at 10:31

## 2018-05-11 NOTE — PROGRESS NOTES
Medium joint arthrocentesis  Date/Time: 5/11/2018 10:31 AM  Consent given by: patient  Site marked: site marked  Timeout: Immediately prior to procedure a time out was called to verify the correct patient, procedure, equipment, support staff and site/side marked as required   Supporting Documentation  Indications: pain and diagnostic evaluation   Procedure Details  Location: elbow - R elbow  Preparation: Patient was prepped and draped in the usual sterile fashion  Needle size: 20 G  Ultrasound guidance: no  Approach: posterior  Medications administered: 0 5 mL lidocaine 1 %    Aspirate amount: 30 mL  Aspirate: blood-tinged  Analysis: fluid sample sent for laboratory analysis  Patient tolerance: patient tolerated the procedure well with no immediate complications  Dressing:  Sterile dressing applied
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
## 2018-06-12 ENCOUNTER — OFFICE VISIT (OUTPATIENT)
Dept: NEUROLOGY | Facility: CLINIC | Age: 63
End: 2018-06-12
Payer: COMMERCIAL

## 2018-06-12 VITALS
SYSTOLIC BLOOD PRESSURE: 173 MMHG | WEIGHT: 235 LBS | HEART RATE: 69 BPM | HEIGHT: 73 IN | DIASTOLIC BLOOD PRESSURE: 84 MMHG | BODY MASS INDEX: 31.14 KG/M2

## 2018-06-12 DIAGNOSIS — R56.9 SEIZURE (HCC): Primary | ICD-10-CM

## 2018-06-12 PROCEDURE — 99214 OFFICE O/P EST MOD 30 MIN: CPT | Performed by: PSYCHIATRY & NEUROLOGY

## 2018-06-12 NOTE — PROGRESS NOTES
Patient ID: Miriam Burnett is a 65 y o  male with hypertension, hyperlipidemia, and a single seizure who is returning to Neurology office for follow up of his seizure  Assessment/Plan:    Seizure [facility])  He has not had any recurrent seizures  Because he is overall doing well, I will not make any changes to his medications today  But would like to assure that he is taking his medications without any events concerning for seizures for at least 2 years before thinking about coming off of his medication  --he will continue levetiracetam 500 mg twice a day  If he would have any additional seizures, his dose could be increased  I spent a total of 15 min with the patient with greater than 50% of that time spent counseling and coordinating his care, specifically discussing his diagnosis, medications and plan, as detailed above         He will return to the office in about 6 months  Subjective:    HPI    Current seizure medications:  1  Levetiracetam 500 mg twice a day  Other medications as per Epic  I last saw him in the office on 2/12/2018  At that time, he was doing well, but had experienced a possible simple partial seizure of feeling on well, which was similar to how he felt before his prior seizure  He had not been consistently taking his levetiracetam prior to that event, but had resumed taking it as instructed  He was tolerating the medication well, so no changes were made to his medications  Since his last visit, he has remained seizure-free  He denies any side effects to his medication  He has been taking his levetiracetam on schedule without any difficulty  He has overall been doing great  He is very pleased with how he has been doing and has no issues or complaints today      Prior Medications: none    The following portions of the patient's history were reviewed and updated as appropriate: allergies, current medications and problem list          Objective:    Blood pressure (!) 173/84, pulse 69, height 6' 1" (1 854 m), weight 107 kg (235 lb)  Physical Exam    Neurological Exam      ROS:    Review of Systems   Constitutional: Negative  HENT: Negative  Eyes: Negative  Respiratory: Negative  Cardiovascular: Negative  Gastrointestinal: Negative  Endocrine: Negative  Genitourinary: Positive for frequency (at night)  Musculoskeletal: Negative  Skin: Negative  Allergic/Immunologic: Negative  Neurological: Negative  Hematological: Negative  Psychiatric/Behavioral: Negative  Negative for sleep disturbance

## 2018-06-13 NOTE — ASSESSMENT & PLAN NOTE
He has not had any recurrent seizures  Because he is overall doing well, I will not make any changes to his medications today  But would like to assure that he is taking his medications without any events concerning for seizures for at least 2 years before thinking about coming off of his medication  --he will continue levetiracetam 500 mg twice a day  If he would have any additional seizures, his dose could be increased

## 2018-09-28 ENCOUNTER — CLINICAL SUPPORT (OUTPATIENT)
Dept: FAMILY MEDICINE CLINIC | Facility: CLINIC | Age: 63
End: 2018-09-28
Payer: COMMERCIAL

## 2018-09-28 DIAGNOSIS — Z23 NEED FOR TDAP VACCINATION: Primary | ICD-10-CM

## 2018-09-28 PROCEDURE — 90471 IMMUNIZATION ADMIN: CPT

## 2018-09-28 PROCEDURE — 90715 TDAP VACCINE 7 YRS/> IM: CPT

## 2018-10-08 ENCOUNTER — TELEPHONE (OUTPATIENT)
Dept: NEUROLOGY | Facility: CLINIC | Age: 63
End: 2018-10-08

## 2018-10-12 ENCOUNTER — TELEPHONE (OUTPATIENT)
Dept: FAMILY MEDICINE CLINIC | Facility: CLINIC | Age: 63
End: 2018-10-12

## 2018-10-12 DIAGNOSIS — J20.9 ACUTE TRACHEOBRONCHITIS: ICD-10-CM

## 2018-10-12 DIAGNOSIS — J20.9 ACUTE TRACHEOBRONCHITIS: Primary | ICD-10-CM

## 2018-10-12 RX ORDER — AZITHROMYCIN 250 MG/1
TABLET, FILM COATED ORAL
Qty: 6 TABLET | Refills: 0 | Status: SHIPPED | OUTPATIENT
Start: 2018-10-12 | End: 2018-10-15 | Stop reason: ALTCHOICE

## 2018-10-12 NOTE — TELEPHONE ENCOUNTER
He is unable to get in today and will make an appt Monday, he has a lot of chest congestion and would like to get an antibiotic to [facility]     He was up for a disability exam and his blood pressure was elevated and will be in Monday

## 2018-10-15 ENCOUNTER — OFFICE VISIT (OUTPATIENT)
Dept: FAMILY MEDICINE CLINIC | Facility: CLINIC | Age: 63
End: 2018-10-15
Payer: COMMERCIAL

## 2018-10-15 VITALS
BODY MASS INDEX: 31.01 KG/M2 | DIASTOLIC BLOOD PRESSURE: 92 MMHG | SYSTOLIC BLOOD PRESSURE: 142 MMHG | HEIGHT: 73 IN | WEIGHT: 234 LBS

## 2018-10-15 DIAGNOSIS — I10 ESSENTIAL HYPERTENSION: Primary | ICD-10-CM

## 2018-10-15 PROCEDURE — 99213 OFFICE O/P EST LOW 20 MIN: CPT | Performed by: FAMILY MEDICINE

## 2018-10-15 PROCEDURE — 3008F BODY MASS INDEX DOCD: CPT | Performed by: FAMILY MEDICINE

## 2018-10-15 RX ORDER — LISINOPRIL 5 MG/1
5 TABLET ORAL DAILY
Qty: 30 TABLET | Refills: 5 | Status: SHIPPED | OUTPATIENT
Start: 2018-10-15 | End: 2020-06-16
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
## 2018-10-16 RX ORDER — AZITHROMYCIN 250 MG/1
TABLET, FILM COATED ORAL
Qty: 6 TABLET | Refills: 0 | Status: SHIPPED | OUTPATIENT
Start: 2018-10-16 | End: 2018-10-16

## 2018-10-18 ENCOUNTER — TELEPHONE (OUTPATIENT)
Dept: FAMILY MEDICINE CLINIC | Facility: CLINIC | Age: 63
End: 2018-10-18

## 2018-11-23 ENCOUNTER — TELEPHONE (OUTPATIENT)
Dept: NEUROLOGY | Facility: CLINIC | Age: 63
End: 2018-11-23

## 2018-12-10 ENCOUNTER — OFFICE VISIT (OUTPATIENT)
Dept: NEUROLOGY | Facility: CLINIC | Age: 63
End: 2018-12-10
Payer: COMMERCIAL

## 2018-12-10 VITALS
HEIGHT: 73 IN | SYSTOLIC BLOOD PRESSURE: 123 MMHG | DIASTOLIC BLOOD PRESSURE: 85 MMHG | BODY MASS INDEX: 29.82 KG/M2 | HEART RATE: 83 BPM | WEIGHT: 225 LBS

## 2018-12-10 DIAGNOSIS — R56.9 SEIZURE (HCC): ICD-10-CM

## 2018-12-10 PROCEDURE — 99213 OFFICE O/P EST LOW 20 MIN: CPT | Performed by: NURSE PRACTITIONER

## 2018-12-10 RX ORDER — LEVETIRACETAM 500 MG/1
500 TABLET ORAL 2 TIMES DAILY
Qty: 60 TABLET | Refills: 11 | Status: SHIPPED | OUTPATIENT
Start: 2018-12-10 | End: 2019-02-17 | Stop reason: SDUPTHER

## 2018-12-10 NOTE — ASSESSMENT & PLAN NOTE
Rosanne Wolf remains seizure free on his current regimen of keppra 500mg twice daily  He is not experiencing any adverse effects  He is interested in eventually trying to come off of this medication  We discussed that his "2 year anniversary" is this coming April  I will have him follow-up with Dr Jose Stephens in June and if he remains seizure free and is still interested in coming off of the keppra, we could pursue MRI and EEG  If both studies are normal, then we could further pursue transitioning him off of the keppra  He was agreeable to this plan  He will call to report any new or suspected seizure activity in the interim

## 2018-12-10 NOTE — PROGRESS NOTES
Patient ID: Britton Nazario is a 59 y o  male  Assessment/Plan:    Seizure [facility])  Jacob Underwood remains seizure free on his current regimen of keppra 500mg twice daily  He is not experiencing any adverse effects  He is interested in eventually trying to come off of this medication  We discussed that his "2 year anniversary" is this coming April  I will have him follow-up with Dr Carina Greene in June and if he remains seizure free and is still interested in coming off of the keppra, we could pursue MRI and EEG  If both studies are normal, then we could further pursue transitioning him off of the keppra  He was agreeable to this plan  He will call to report any new or suspected seizure activity in the interim  Subjective:    Britton Nazario is a 59 y o  male with hypertension, hyperlipidemia, and a single seizure who is returning to Neurology office for follow up of his seizure  He was last seen by Dr Carina Greene on 6/12/18 at which time he was doing well and remained seizure free on keppra 500mg BID  Today Mr Familia Limon presents for follow-up  He is overall doing well, although he is suffering from a cold and thinks he may have walking pneumonia  He was recently treated with 2 z-packs but it has not help  He plans on calling his PCP for this  He remains seizure free  He denies any history of myoclonus, staring spells, automatisms, unexplained hyperkinetic behaviors, olfactory / gustatory hallucinations, epigastric rising events, alondra vu events, visual hallucinations, unexplained nocturnal enuresis, or nocturnal tongue biting  Current AEDs:  keppra 500mg BID  No missed doses  No adverse effects              The following portions of the patient's history were reviewed and updated as appropriate: past family history, past social history and past surgical history  Objective:    Blood pressure 123/85, pulse 83, height 6' 1" (1 854 m), weight 102 kg (225 lb)      Physical Exam   Constitutional: He appears well-developed and well-nourished  HENT:   Head: Normocephalic  Eyes: Pupils are equal, round, and reactive to light  Lids are normal    Neurological: He has normal strength  Psychiatric: He has a normal mood and affect  His speech is normal and behavior is normal    Vitals reviewed  Neurological Exam  Mental Status  Awake, alert and oriented to person, place and time  Speech is normal  Language is fluent with no aphasia  Attention and concentration are normal  Fund of knowledge is appropriate for level of education  Cranial Nerves  CN II: Visual acuity is normal  Visual fields full to confrontation  CN III, IV, VI: Extraocular movements intact bilaterally  Normal lids and orbits bilaterally  Pupils equal round and reactive to light bilaterally  CN V: Facial sensation is normal   CN VII: Full and symmetric facial movement  CN VIII: Hearing is normal   CN IX, X: Palate elevates symmetrically  Normal gag reflex  CN XI: Shoulder shrug strength is normal   CN XII: Tongue midline without atrophy or fasciculations  Motor   Strength is 5/5 throughout all four extremities  Gait  Casual gait is normal including stance, stride, and arm swing  Able to rise from chair without using arms  ROS:    Review of Systems   Constitutional: Negative  Negative for appetite change and fever  HENT: Negative  Negative for hearing loss, tinnitus, trouble swallowing and voice change  Eyes: Negative  Negative for photophobia and pain  Respiratory: Positive for cough  Negative for shortness of breath  Cardiovascular: Negative  Negative for palpitations  Gastrointestinal: Negative  Negative for nausea and vomiting  Endocrine: Negative  Negative for cold intolerance and heat intolerance  Genitourinary: Negative  Negative for dysuria, frequency and urgency  Musculoskeletal: Negative  Negative for myalgias and neck pain  Skin: Negative  Negative for rash  Allergic/Immunologic: Negative  Neurological: Negative  Negative for dizziness, tremors, seizures, syncope, facial asymmetry, speech difficulty, weakness, light-headedness, numbness and headaches  Hematological: Negative  Does not bruise/bleed easily  Psychiatric/Behavioral: Negative  Negative for confusion, hallucinations and sleep disturbance

## 2018-12-10 NOTE — PATIENT INSTRUCTIONS
1  Continue on keppra 500mg twice daily  2  Call us to report any new or suspected seizure activity

## 2018-12-12 ENCOUNTER — TELEPHONE (OUTPATIENT)
Dept: FAMILY MEDICINE CLINIC | Facility: CLINIC | Age: 63
End: 2018-12-12

## 2018-12-12 ENCOUNTER — OFFICE VISIT (OUTPATIENT)
Dept: FAMILY MEDICINE CLINIC | Facility: CLINIC | Age: 63
End: 2018-12-12
Payer: COMMERCIAL

## 2018-12-12 VITALS
TEMPERATURE: 98.5 F | SYSTOLIC BLOOD PRESSURE: 152 MMHG | BODY MASS INDEX: 30.64 KG/M2 | WEIGHT: 232.2 LBS | DIASTOLIC BLOOD PRESSURE: 88 MMHG

## 2018-12-12 DIAGNOSIS — J40 BRONCHITIS: Primary | ICD-10-CM

## 2018-12-12 PROCEDURE — 99213 OFFICE O/P EST LOW 20 MIN: CPT | Performed by: PHYSICIAN ASSISTANT

## 2018-12-12 RX ORDER — PREDNISONE 10 MG/1
TABLET ORAL
Qty: 20 TABLET | Refills: 0 | Status: SHIPPED | OUTPATIENT
Start: 2018-12-12 | End: 2019-01-17 | Stop reason: ALTCHOICE

## 2018-12-12 RX ORDER — BENZONATATE 100 MG/1
100 CAPSULE ORAL 3 TIMES DAILY PRN
Qty: 30 CAPSULE | Refills: 0 | Status: SHIPPED | OUTPATIENT
Start: 2018-12-12 | End: 2019-01-17 | Stop reason: ALTCHOICE

## 2018-12-12 RX ORDER — CLINDAMYCIN HYDROCHLORIDE 300 MG/1
300 CAPSULE ORAL 3 TIMES DAILY
Qty: 21 CAPSULE | Refills: 0 | Status: SHIPPED | OUTPATIENT
Start: 2018-12-12 | End: 2018-12-19
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
## 2018-12-12 NOTE — TELEPHONE ENCOUNTER
He has a cold for 2 weeks, unable to shake it  Requesting amoxicillin , had 2 z packs and it did not work    His blood pressure is good 126/85

## 2019-01-17 ENCOUNTER — TELEPHONE (OUTPATIENT)
Dept: FAMILY MEDICINE CLINIC | Facility: CLINIC | Age: 64
End: 2019-01-17

## 2019-01-17 ENCOUNTER — TRANSCRIBE ORDERS (OUTPATIENT)
Dept: RADIOLOGY | Facility: MEDICAL CENTER | Age: 64
End: 2019-01-17

## 2019-01-17 ENCOUNTER — APPOINTMENT (OUTPATIENT)
Dept: RADIOLOGY | Facility: MEDICAL CENTER | Age: 64
End: 2019-01-17
Payer: COMMERCIAL

## 2019-01-17 ENCOUNTER — OFFICE VISIT (OUTPATIENT)
Dept: FAMILY MEDICINE CLINIC | Facility: CLINIC | Age: 64
End: 2019-01-17
Payer: COMMERCIAL

## 2019-01-17 VITALS
HEART RATE: 75 BPM | OXYGEN SATURATION: 98 % | BODY MASS INDEX: 30.3 KG/M2 | TEMPERATURE: 98.7 F | HEIGHT: 73 IN | DIASTOLIC BLOOD PRESSURE: 68 MMHG | WEIGHT: 228.6 LBS | SYSTOLIC BLOOD PRESSURE: 120 MMHG

## 2019-01-17 DIAGNOSIS — R05.9 COUGH: Primary | ICD-10-CM

## 2019-01-17 DIAGNOSIS — R05.9 COUGH: ICD-10-CM

## 2019-01-17 PROCEDURE — 3008F BODY MASS INDEX DOCD: CPT | Performed by: PHYSICIAN ASSISTANT

## 2019-01-17 PROCEDURE — 99213 OFFICE O/P EST LOW 20 MIN: CPT | Performed by: PHYSICIAN ASSISTANT

## 2019-01-17 PROCEDURE — 71046 X-RAY EXAM CHEST 2 VIEWS: CPT
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
## 2019-01-17 NOTE — TELEPHONE ENCOUNTER
Pt seen a few wks ago and given an clindamycin, benzonatate and prednisone  Pt still c/o head congestion  Asking for another antibiotic to pharmacy?

## 2019-01-18 DIAGNOSIS — R05.9 COUGH: Primary | ICD-10-CM

## 2019-01-18 RX ORDER — FLUTICASONE PROPIONATE 50 MCG
2 SPRAY, SUSPENSION (ML) NASAL DAILY
Qty: 1 BOTTLE | Refills: 2 | Status: SHIPPED | OUTPATIENT
Start: 2019-01-18 | End: 2020-06-16

## 2019-01-21 ENCOUNTER — TELEPHONE (OUTPATIENT)
Dept: FAMILY MEDICINE CLINIC | Facility: CLINIC | Age: 64
End: 2019-01-21

## 2019-01-21 NOTE — TELEPHONE ENCOUNTER
If this was bacterial, the multiple zpacks or clindamycin would have worked  It's not working because this is not a bacterial infection  Continue flonase, try stopping lisinopril  Sometimes after a period of time, lisinopril can cause a chronic cough  If cough improves after a few days of stopping lisinopril, then that was the cause of his cough  Monitor BP at home, but he was only on 5 mg lisinopril and I don't expect to see a bump in his BP at all

## 2019-01-21 NOTE — TELEPHONE ENCOUNTER
Patient thinks he needs an antibiotic to knock this out of him, said he picked up the nasal spray but thinks he needs something else  I explained to him several reasons why you didn't call in one but told him I would talk to you to make sure

## 2019-02-17 DIAGNOSIS — R56.9 SEIZURE (HCC): ICD-10-CM

## 2019-02-18 RX ORDER — LEVETIRACETAM 500 MG/1
TABLET ORAL
Qty: 60 TABLET | Refills: 11 | Status: SHIPPED | OUTPATIENT
Start: 2019-02-18 | End: 2019-10-25 | Stop reason: SDUPTHER

## 2019-06-14 ENCOUNTER — OFFICE VISIT (OUTPATIENT)
Dept: NEUROLOGY | Facility: CLINIC | Age: 64
End: 2019-06-14
Payer: COMMERCIAL

## 2019-06-14 VITALS
HEIGHT: 73 IN | SYSTOLIC BLOOD PRESSURE: 142 MMHG | HEART RATE: 83 BPM | DIASTOLIC BLOOD PRESSURE: 84 MMHG | WEIGHT: 234 LBS | BODY MASS INDEX: 31.01 KG/M2

## 2019-06-14 DIAGNOSIS — R56.9 SEIZURE (HCC): Primary | ICD-10-CM

## 2019-06-14 PROCEDURE — 99213 OFFICE O/P EST LOW 20 MIN: CPT | Performed by: PSYCHIATRY & NEUROLOGY

## 2019-06-20 ENCOUNTER — HOSPITAL ENCOUNTER (OUTPATIENT)
Dept: NEUROLOGY | Facility: HOSPITAL | Age: 64
Discharge: HOME | End: 2019-06-20
Attending: PSYCHIATRY & NEUROLOGY
Payer: COMMERCIAL

## 2019-06-20 DIAGNOSIS — R56.9 SEIZURE (HCC): ICD-10-CM

## 2019-06-20 PROCEDURE — 95816 EEG AWAKE AND DROWSY: CPT

## 2019-06-20 PROCEDURE — 95816 EEG AWAKE AND DROWSY: CPT | Performed by: PSYCHIATRY & NEUROLOGY

## 2019-06-24 ENCOUNTER — TELEPHONE (OUTPATIENT)
Dept: NEUROLOGY | Facility: CLINIC | Age: 64
End: 2019-06-24

## 2019-10-25 ENCOUNTER — OFFICE VISIT (OUTPATIENT)
Dept: NEUROLOGY | Facility: CLINIC | Age: 64
End: 2019-10-25
Payer: COMMERCIAL

## 2019-10-25 VITALS
HEART RATE: 55 BPM | BODY MASS INDEX: 30.34 KG/M2 | SYSTOLIC BLOOD PRESSURE: 152 MMHG | HEIGHT: 73 IN | WEIGHT: 228.9 LBS | DIASTOLIC BLOOD PRESSURE: 88 MMHG

## 2019-10-25 DIAGNOSIS — R56.9 SEIZURE (HCC): ICD-10-CM

## 2019-10-25 PROCEDURE — 99214 OFFICE O/P EST MOD 30 MIN: CPT | Performed by: PSYCHIATRY & NEUROLOGY

## 2019-10-25 RX ORDER — LEVETIRACETAM 500 MG/1
500 TABLET ORAL 2 TIMES DAILY
Qty: 60 TABLET | Refills: 11 | Status: SHIPPED | OUTPATIENT
Start: 2019-10-25 | End: 2020-11-16

## 2019-10-25 NOTE — PATIENT INSTRUCTIONS
-- Please restart taking Levetiracetam (Keppra) 500 mg (1 tab) twice a day  -- I would recommend not driving for at least the next 2 weeks to assure the medication gets back in your system

## 2019-10-25 NOTE — PROGRESS NOTES
Patient ID: Lindalee Opitz is a 62 y o  male with hypertension, hyperlipidemia, and localization related epilepsy, who is returning to Neurology office for follow up of his seizures  Assessment/Plan:    Seizure [facility])  The event that he experienced on 10/12/2019 is most consistent with a breakthrough seizure  He had a similar prodrome as with his prior seizures and was unresponsive for a period of time  I discussed that because of this seizure, I would recommend that he restart Levetiracetam  He was tolerating this well previously without any additional seizures for a long time  -- he will restart Levetiracetam 500 mg twice a day  If he would have additional seizures, his dose could be increased  -- I will need to send a [place] [employer] seizure reporting form  I discussed that because of his seizure, he should not drive until he is back up on his dose of Levetiracetam  He previously was seizure free when taking this and the Levetiracetam was stopped after his recent EEG  I spent a total of 25 min with the patient with greater than 50% of that time spent counseling and coordinating his care, specifically discussing his diagnosis, medications, driving, and plan, as detailed above     He will return to the office in about 3 months  Subjective:    HPI  Current medications as per Epic  He is not currently on seizure medications    I last saw him in the office on 6/14/2019  At that time, he was doing well without any recurrent seizures  He wanted to try to come off of medications, so he was arranged to get a routine EEG to help evaluate his risk of a recurrent event  Since his last visit, he obtained a repeat EEG, which was normal  He was called with the results and instructed to come off of Levetiracetam  He felt well coming off of this medication, but had a recurrent event on 10/12/2019  He was in the back of a car with friends and had an odd feeling with a bad taste in his mouth   He then was unresponsive when his friends attempted to interact with him  He lost a chunk of memory  He denies any other seizures, but did have an episode about 1-2 months ago where he awoke having bitten his tongue, without any clear reason  Prior Seizure Medications: Levetiracetam (stopped due to prolonged period of seizure freedom)    His history was also obtained from his friend, who was present at today's visit  The following portions of the patient's history were reviewed and updated as appropriate: allergies, current medications and problem list      Objective:    Blood pressure 152/88, pulse 55, height 6' 1" (1 854 m), weight 104 kg (228 lb 14 4 oz)  Physical Exam    Neurological Exam      ROS:    Review of Systems   Constitutional: Negative  Negative for appetite change and fever  HENT: Negative  Negative for hearing loss, tinnitus, trouble swallowing and voice change  Strange taste in mouth   Eyes: Negative  Negative for photophobia and pain  Respiratory: Negative  Negative for shortness of breath  Cardiovascular: Negative  Negative for palpitations  Gastrointestinal: Negative  Negative for nausea and vomiting  Endocrine: Negative  Negative for cold intolerance and heat intolerance  Genitourinary: Negative  Negative for dysuria, frequency and urgency  Musculoskeletal: Positive for arthralgias  Negative for myalgias and neck pain  Skin: Negative  Negative for rash  Neurological: Negative  Negative for dizziness, tremors, seizures, syncope, facial asymmetry, speech difficulty, weakness, light-headedness, numbness and headaches  Experienced a possible Aura lasted about 1-1/2 minutes  First time sitting in the back seat, unsure if this caused this "funky" feeling   Hematological: Negative  Does not bruise/bleed easily  Psychiatric/Behavioral: Negative  Negative for confusion, hallucinations and sleep disturbance         I personally reviewed the ROS that was entered by the medical assistant

## 2019-11-05 NOTE — ASSESSMENT & PLAN NOTE
The event that he experienced on 10/12/2019 is most consistent with a breakthrough seizure  He had a similar prodrome as with his prior seizures and was unresponsive for a period of time  I discussed that because of this seizure, I would recommend that he restart Levetiracetam  He was tolerating this well previously without any additional seizures for a long time  -- he will restart Levetiracetam 500 mg twice a day  If he would have additional seizures, his dose could be increased  -- I will need to send a [facility] seizure reporting form  I discussed that because of his seizure, he should not drive until he is back up on his dose of Levetiracetam  He previously was seizure free when taking this and the Levetiracetam was stopped after his recent EEG

## 2020-03-13 ENCOUNTER — OFFICE VISIT (OUTPATIENT)
Dept: NEUROLOGY | Facility: CLINIC | Age: 65
End: 2020-03-13
Payer: COMMERCIAL

## 2020-03-13 VITALS
HEIGHT: 73 IN | BODY MASS INDEX: 31.25 KG/M2 | TEMPERATURE: 98.3 F | WEIGHT: 235.8 LBS | HEART RATE: 68 BPM | SYSTOLIC BLOOD PRESSURE: 175 MMHG | DIASTOLIC BLOOD PRESSURE: 91 MMHG

## 2020-03-13 DIAGNOSIS — R56.9 SEIZURE (HCC): Primary | ICD-10-CM

## 2020-03-13 DIAGNOSIS — M25.512 CHRONIC PAIN OF BOTH SHOULDERS: ICD-10-CM

## 2020-03-13 DIAGNOSIS — G89.29 CHRONIC PAIN OF BOTH SHOULDERS: ICD-10-CM

## 2020-03-13 DIAGNOSIS — M25.511 CHRONIC PAIN OF BOTH SHOULDERS: ICD-10-CM

## 2020-03-13 DIAGNOSIS — I10 ESSENTIAL HYPERTENSION: ICD-10-CM

## 2020-03-13 PROCEDURE — 3008F BODY MASS INDEX DOCD: CPT | Performed by: PSYCHIATRY & NEUROLOGY

## 2020-03-13 PROCEDURE — 3077F SYST BP >= 140 MM HG: CPT | Performed by: PSYCHIATRY & NEUROLOGY

## 2020-03-13 PROCEDURE — 99214 OFFICE O/P EST MOD 30 MIN: CPT | Performed by: PSYCHIATRY & NEUROLOGY

## 2020-03-13 PROCEDURE — 3080F DIAST BP >= 90 MM HG: CPT | Performed by: PSYCHIATRY & NEUROLOGY

## 2020-03-13 NOTE — ASSESSMENT & PLAN NOTE
Patient has a long history of shoulder pain  Though he was initially concerned because he had right shoulder stiffness prior to his last seizure, the fact that the shoulder pain/stiffness is bilateral and worse positionally, it is unlikely this is related to his epilepsy  He has been seen by Rheumatology in the past  Instructed to follow-up with his PCP for his shoulder pain and they may refer him back to Rheumatology if necessary

## 2020-03-13 NOTE — ASSESSMENT & PLAN NOTE
Patient's blood pressure is elevated in the office today  Educated on the importance of blood pressure control and risks of HTN  Instructed to call his PCP to make aware that his blood pressure is elevated as he is no longer taking Lisinopril

## 2020-03-13 NOTE — PROGRESS NOTES
Patient ID: Hannah Florian is a 62 y o  male with hypertension, hyperlipidemia, and localization relted epilepsy, who is returning to Neurology office for 4 month follow up of his seizures  Assessment/Plan:    Essential hypertension  Patient's blood pressure is elevated in the office today  Educated on the importance of blood pressure control and risks of HTN  Instructed to call his PCP to make aware that his blood pressure is elevated as he is no longer taking Lisinopril  Seizure [facility])  Patient has remained seizure free since the last  seizure on 10/12/2019 before he restarted his current dose of Levetiracetam  He will continue to take his Levetiracetam 500mg twice per day  He will call the office if he has any other seizures or concerns  Chronic pain of both shoulders  Patient has a long history of shoulder pain  Though he was initially concerned because he had right shoulder stiffness prior to his last seizure, the fact that the shoulder pain/stiffness is bilateral and worse positionally, it is unlikely this is related to his epilepsy  He has been seen by Rheumatology in the past  Instructed to follow-up with his PCP for his shoulder pain and they may refer him back to Rheumatology if necessary  He will Return in about 6 months (around 9/13/2020) for 4-6 months with Desirae or Dr Edouard Comer  Subjective:    HPI  Current seizure medications:  1  Levetiracetam 500mg twice per day  Other medications as per Epic  Dr Edouard Comer last saw him in the office on 10/25/2019  Prior to that visit, he had stopped his Levetiracetam after obtaining a normal EEG  On 10/21/2019 while in the car with his friend he had a seizure  It was decided to restart his Levetiracetam at 500mg twice daily, the dose he was on prior to stopping it  Since his last visit, he has remained seizure free  He denies any side effects or missed doses  He is concerned about shoulder pain, bilateral but R>L   He finds it concerning because he had right shoulder stiffness immediately prior to his last seizure  Upon further questioning, the patient has had bilateral chronic shoulder pain for many years that waxes and wanes  It is worse depending on which side he is laying on at night  He has been seen by Rheumatology (Dr Jenni Jorgensen) in the past and was on Celebrex and Sulfasalazine per the patient  Prior Seizure Medications: none      The following portions of the patient's history were reviewed and updated as appropriate: allergies, current medications, past family history, past medical history, past social history, past surgical history and problem list      Objective:    Blood pressure (!) 175/91, pulse 68, temperature 98 3 °F (36 8 °C), height 6' 1" (1 854 m), weight 107 kg (235 lb 12 8 oz)  Physical Exam   Constitutional: He appears well-developed and well-nourished  HENT:   Head: Normocephalic and atraumatic  Right Ear: Hearing normal    Left Ear: Hearing normal    Eyes: Pupils are equal, round, and reactive to light  Conjunctivae and lids are normal    Pulmonary/Chest: Effort normal    Neurological: He is alert  He has normal strength and normal reflexes  Skin: Skin is warm and dry  Psychiatric: His speech is normal    Vitals reviewed  Neurological Exam  Mental Status  Alert  Oriented to person, place and time  Speech is normal  Language is fluent with no aphasia  Cranial Nerves  CN II: Visual acuity is normal  Visual fields full to confrontation  CN III, IV, VI: Extraocular movements intact bilaterally  Normal lids and orbits bilaterally  Pupils equal round and reactive to light bilaterally  CN V:  Right: Facial sensation is normal   Left: Facial sensation is normal on the left  CN VII:  Right: There is no facial weakness  Left: There is no facial weakness    CN VIII:  Right: Hearing is normal   Left: Hearing is normal   CN IX, X: Palate elevates symmetrically  CN XI: Shoulder shrug strength is normal   CN XII: Tongue midline without atrophy or fasciculations  Motor  Normal muscle bulk throughout  No fasciculations present  Normal muscle tone  No abnormal involuntary movements  Strength is 5/5 throughout all four extremities  Sensory  Light touch is normal in upper and lower extremities  Reflexes  Deep tendon reflexes are 2+ and symmetric in all four extremities with downgoing toes bilaterally  Coordination  Right: Finger-to-nose normal  Rapid alternating movement normal   Left: Finger-to-nose normal  Rapid alternating movement normal     Gait  Casual gait: Wide stance  ROS:    Review of Systems   Constitutional: Positive for appetite change  Negative for fever  HENT: Negative  Negative for hearing loss, tinnitus, trouble swallowing and voice change  Eyes: Negative  Negative for photophobia and pain  Respiratory: Negative  Negative for shortness of breath  Cardiovascular: Negative  Negative for palpitations  Gastrointestinal: Negative  Negative for nausea and vomiting  Endocrine: Negative  Negative for cold intolerance and heat intolerance  Genitourinary: Negative  Negative for dysuria, frequency and urgency  Musculoskeletal: Positive for arthralgias  Negative for myalgias and neck pain  Shoulder Pain   Skin: Negative  Negative for rash  Neurological: Negative  Negative for dizziness, tremors, seizures, syncope, facial asymmetry, speech difficulty, weakness, light-headedness, numbness and headaches  Hematological: Negative  Does not bruise/bleed easily  Psychiatric/Behavioral: Positive for sleep disturbance  Negative for confusion and hallucinations         I personally reviewed the ROS that was entered by the medical assistant

## 2020-03-13 NOTE — PATIENT INSTRUCTIONS
1  Please call Dr Lisa Hudson (your primary care physician) about your blood pressure and shoulder pain  2  Continue your Levetiracetam at 500mg twice per day  3  Please call the office with any seizures or concerns     4  Follow-up with us in 4-6 months

## 2020-03-13 NOTE — ASSESSMENT & PLAN NOTE
Patient has remained seizure free since the last  seizure on 10/12/2019 before he restarted his current dose of Levetiracetam  He will continue to take his Levetiracetam 500mg twice per day  He will call the office if he has any other seizures or concerns

## 2020-06-16 ENCOUNTER — OFFICE VISIT (OUTPATIENT)
Dept: FAMILY MEDICINE CLINIC | Facility: CLINIC | Age: 65
End: 2020-06-16
Payer: COMMERCIAL

## 2020-06-16 VITALS
SYSTOLIC BLOOD PRESSURE: 158 MMHG | DIASTOLIC BLOOD PRESSURE: 86 MMHG | OXYGEN SATURATION: 98 % | HEIGHT: 73 IN | TEMPERATURE: 95.8 F | HEART RATE: 78 BPM | WEIGHT: 240.2 LBS | BODY MASS INDEX: 31.83 KG/M2

## 2020-06-16 DIAGNOSIS — R05.9 COUGH: ICD-10-CM

## 2020-06-16 DIAGNOSIS — I10 ESSENTIAL HYPERTENSION: ICD-10-CM

## 2020-06-16 DIAGNOSIS — R22.1 MASS OF RIGHT SIDE OF NECK: Primary | ICD-10-CM

## 2020-06-16 DIAGNOSIS — Z13.31 DEPRESSION SCREENING NEGATIVE: ICD-10-CM

## 2020-06-16 DIAGNOSIS — Z12.11 SCREENING FOR COLON CANCER: ICD-10-CM

## 2020-06-16 DIAGNOSIS — Z71.6 TOBACCO ABUSE COUNSELING: ICD-10-CM

## 2020-06-16 PROCEDURE — 4004F PT TOBACCO SCREEN RCVD TLK: CPT | Performed by: PHYSICIAN ASSISTANT

## 2020-06-16 PROCEDURE — 99213 OFFICE O/P EST LOW 20 MIN: CPT | Performed by: PHYSICIAN ASSISTANT

## 2020-06-16 PROCEDURE — 3008F BODY MASS INDEX DOCD: CPT | Performed by: PHYSICIAN ASSISTANT

## 2020-06-16 PROCEDURE — 3079F DIAST BP 80-89 MM HG: CPT | Performed by: PHYSICIAN ASSISTANT

## 2020-06-16 PROCEDURE — 3077F SYST BP >= 140 MM HG: CPT | Performed by: PHYSICIAN ASSISTANT

## 2020-06-16 RX ORDER — LISINOPRIL 5 MG/1
TABLET ORAL
Qty: 30 TABLET | Refills: 5 | Status: SHIPPED | OUTPATIENT
Start: 2020-06-16 | End: 2020-06-16 | Stop reason: SDUPTHER

## 2020-06-16 RX ORDER — FLUTICASONE PROPIONATE 50 MCG
2 SPRAY, SUSPENSION (ML) NASAL DAILY
Qty: 16 G | Refills: 5 | Status: SHIPPED | OUTPATIENT
Start: 2020-06-16 | End: 2022-02-02 | Stop reason: ALTCHOICE

## 2020-06-16 RX ORDER — FLUTICASONE PROPIONATE 50 MCG
SPRAY, SUSPENSION (ML) NASAL
Qty: 16 G | Refills: 2 | Status: SHIPPED | OUTPATIENT
Start: 2020-06-16 | End: 2020-06-16 | Stop reason: SDUPTHER

## 2020-06-16 RX ORDER — LISINOPRIL 5 MG/1
5 TABLET ORAL DAILY
Qty: 30 TABLET | Refills: 5 | Status: SHIPPED | OUTPATIENT
Start: 2020-06-16 | End: 2020-11-15 | Stop reason: SDUPTHER

## 2020-06-17 ENCOUNTER — TELEPHONE (OUTPATIENT)
Dept: GASTROENTEROLOGY | Facility: CLINIC | Age: 65
End: 2020-06-17

## 2020-06-19 ENCOUNTER — HOSPITAL ENCOUNTER (OUTPATIENT)
Dept: ULTRASOUND IMAGING | Facility: HOSPITAL | Age: 65
Discharge: HOME | End: 2020-06-19
Payer: COMMERCIAL

## 2020-06-19 DIAGNOSIS — R22.1 MASS OF RIGHT SIDE OF NECK: ICD-10-CM

## 2020-06-19 PROCEDURE — 76536 US EXAM OF HEAD AND NECK: CPT

## 2020-06-24 DIAGNOSIS — K11.8 MASS OF RIGHT PAROTID GLAND: Primary | ICD-10-CM

## 2020-07-16 ENCOUNTER — PREP FOR PROCEDURE (OUTPATIENT)
Dept: GASTROENTEROLOGY | Facility: CLINIC | Age: 65
End: 2020-07-16

## 2020-07-16 ENCOUNTER — TELEPHONE (OUTPATIENT)
Dept: GASTROENTEROLOGY | Facility: CLINIC | Age: 65
End: 2020-07-16

## 2020-07-16 DIAGNOSIS — Z12.11 SCREEN FOR COLON CANCER: Primary | ICD-10-CM

## 2020-07-16 NOTE — TELEPHONE ENCOUNTER
Called patient to schedule OA  Colonoscopy  Scheduled 9/15/20 with Dr Bethel Smith, [address] [facility] directions and procedure packet mailed to patient  Patient will contact our office to go over directions once received  He is aware of COVID testing

## 2020-08-21 ENCOUNTER — TRANSCRIBE ORDERS (OUTPATIENT)
Dept: ADMINISTRATIVE | Facility: HOSPITAL | Age: 65
End: 2020-08-21

## 2020-08-21 DIAGNOSIS — M54.42 LEFT-SIDED LOW BACK PAIN WITH BILATERAL SCIATICA, UNSPECIFIED CHRONICITY: Primary | ICD-10-CM

## 2020-08-21 DIAGNOSIS — M54.41 LEFT-SIDED LOW BACK PAIN WITH BILATERAL SCIATICA, UNSPECIFIED CHRONICITY: Primary | ICD-10-CM

## 2020-08-21 DIAGNOSIS — M25.552 LEFT HIP PAIN: ICD-10-CM

## 2020-08-27 ENCOUNTER — HOSPITAL ENCOUNTER (OUTPATIENT)
Dept: RADIOLOGY | Facility: HOSPITAL | Age: 65
Discharge: HOME | End: 2020-08-27
Attending: ANESTHESIOLOGY
Payer: COMMERCIAL

## 2020-08-27 ENCOUNTER — HOSPITAL ENCOUNTER (OUTPATIENT)
Dept: MRI IMAGING | Facility: HOSPITAL | Age: 65
Discharge: HOME | End: 2020-08-27
Attending: ANESTHESIOLOGY
Payer: COMMERCIAL

## 2020-08-27 DIAGNOSIS — M54.42 LEFT-SIDED LOW BACK PAIN WITH BILATERAL SCIATICA, UNSPECIFIED CHRONICITY: ICD-10-CM

## 2020-08-27 DIAGNOSIS — M54.41 LEFT-SIDED LOW BACK PAIN WITH BILATERAL SCIATICA, UNSPECIFIED CHRONICITY: ICD-10-CM

## 2020-08-27 DIAGNOSIS — M25.552 LEFT HIP PAIN: ICD-10-CM

## 2020-08-27 PROCEDURE — 72148 MRI LUMBAR SPINE W/O DYE: CPT

## 2020-08-27 PROCEDURE — G1004 CDSM NDSC: HCPCS

## 2020-08-27 PROCEDURE — 73502 X-RAY EXAM HIP UNI 2-3 VIEWS: CPT

## 2020-09-14 ENCOUNTER — ANESTHESIA EVENT (OUTPATIENT)
Dept: PERIOP | Facility: HOSPITAL | Age: 65
End: 2020-09-14

## 2020-09-14 NOTE — ANESTHESIA PREPROCEDURE EVALUATION
Procedure:  COLONOSCOPY    Relevant Problems   CARDIO   (+) Essential hypertension   (+) Hyperlipidemia      NEURO/PSYCH   (+) Seizure (HCC)        Physical Exam    Airway    Mallampati score: II  TM Distance: >3 FB  Neck ROM: full     Dental   No notable dental hx     Cardiovascular  Rhythm: regular, Rate: normal, Cardiovascular exam normal    Pulmonary  Pulmonary exam normal Breath sounds clear to auscultation,     Other Findings        Anesthesia Plan  ASA Score- 2     Anesthesia Type- IV sedation with anesthesia with ASA Monitors  Additional Monitors:   Airway Plan:           Plan Factors-Exercise tolerance (METS): >4 METS  Chart reviewed  EKG reviewed  Existing labs reviewed  Patient is not a current smoker  Induction- intravenous  Postoperative Plan- Plan for postoperative opioid use  Informed Consent- Anesthetic plan and risks discussed with patient  I personally reviewed this patient with the CRNA  Discussed and agreed on the Anesthesia Plan with the ANH Barbosa

## 2020-09-15 ENCOUNTER — ANESTHESIA (OUTPATIENT)
Dept: PERIOP | Facility: HOSPITAL | Age: 65
End: 2020-09-15

## 2020-09-15 ENCOUNTER — HOSPITAL ENCOUNTER (OUTPATIENT)
Dept: PERIOP | Facility: HOSPITAL | Age: 65
Setting detail: OUTPATIENT SURGERY
Discharge: HOME | End: 2020-09-15
Attending: INTERNAL MEDICINE | Admitting: INTERNAL MEDICINE
Payer: COMMERCIAL

## 2020-09-15 VITALS
SYSTOLIC BLOOD PRESSURE: 140 MMHG | WEIGHT: 244 LBS | DIASTOLIC BLOOD PRESSURE: 74 MMHG | OXYGEN SATURATION: 98 % | TEMPERATURE: 98.4 F | HEIGHT: 73 IN | RESPIRATION RATE: 20 BRPM | HEART RATE: 60 BPM | BODY MASS INDEX: 32.34 KG/M2

## 2020-09-15 VITALS — HEART RATE: 58 BPM

## 2020-09-15 DIAGNOSIS — Z12.11 SCREEN FOR COLON CANCER: ICD-10-CM

## 2020-09-15 DIAGNOSIS — K57.92 DIVERTICULITIS: Primary | ICD-10-CM

## 2020-09-15 PROCEDURE — 45380 COLONOSCOPY AND BIOPSY: CPT | Performed by: INTERNAL MEDICINE

## 2020-09-15 PROCEDURE — 88305 TISSUE EXAM BY PATHOLOGIST: CPT | Performed by: PATHOLOGY

## 2020-09-15 RX ORDER — PROPOFOL 10 MG/ML
INJECTION, EMULSION INTRAVENOUS CONTINUOUS PRN
Status: DISCONTINUED | OUTPATIENT
Start: 2020-09-15 | End: 2020-09-15

## 2020-09-15 RX ORDER — SODIUM CHLORIDE, SODIUM LACTATE, POTASSIUM CHLORIDE, CALCIUM CHLORIDE 600; 310; 30; 20 MG/100ML; MG/100ML; MG/100ML; MG/100ML
125 INJECTION, SOLUTION INTRAVENOUS CONTINUOUS
Status: DISCONTINUED | OUTPATIENT
Start: 2020-09-15 | End: 2020-09-19 | Stop reason: HOSPADM

## 2020-09-15 RX ORDER — CIPROFLOXACIN 500 MG/1
500 TABLET, FILM COATED ORAL EVERY 12 HOURS SCHEDULED
Qty: 20 TABLET | Refills: 0 | Status: SHIPPED | OUTPATIENT
Start: 2020-09-15 | End: 2020-09-25

## 2020-09-15 RX ORDER — ONDANSETRON 2 MG/ML
4 INJECTION INTRAMUSCULAR; INTRAVENOUS ONCE AS NEEDED
Status: DISCONTINUED | OUTPATIENT
Start: 2020-09-15 | End: 2020-09-19 | Stop reason: HOSPADM

## 2020-09-15 RX ORDER — METRONIDAZOLE 500 MG/1
500 TABLET ORAL EVERY 8 HOURS SCHEDULED
Qty: 30 TABLET | Refills: 0 | Status: SHIPPED | OUTPATIENT
Start: 2020-09-15 | End: 2020-09-25

## 2020-09-15 RX ORDER — PROPOFOL 10 MG/ML
INJECTION, EMULSION INTRAVENOUS AS NEEDED
Status: DISCONTINUED | OUTPATIENT
Start: 2020-09-15 | End: 2020-09-15

## 2020-09-15 RX ADMIN — SODIUM CHLORIDE, SODIUM LACTATE, POTASSIUM CHLORIDE, AND CALCIUM CHLORIDE 125 ML/HR: .6; .31; .03; .02 INJECTION, SOLUTION INTRAVENOUS at 10:43

## 2020-09-15 RX ADMIN — PROPOFOL 120 MCG/KG/MIN: 10 INJECTION, EMULSION INTRAVENOUS at 11:40

## 2020-09-15 RX ADMIN — PHENYLEPHRINE HYDROCHLORIDE 100 MCG: 10 INJECTION INTRAVENOUS at 12:03

## 2020-09-15 RX ADMIN — PHENYLEPHRINE HYDROCHLORIDE 100 MCG: 10 INJECTION INTRAVENOUS at 12:06

## 2020-09-15 RX ADMIN — PROPOFOL 100 MG: 10 INJECTION, EMULSION INTRAVENOUS at 11:40

## 2020-09-15 NOTE — ANESTHESIA POSTPROCEDURE EVALUATION
Post-Op Assessment Note    CV Status:  Stable    Pain management: adequate     Mental Status:  Alert and awake   Hydration Status:  Euvolemic   PONV Controlled:  Controlled   Airway Patency:  Patent      Post Op Vitals Reviewed: Yes      Staff: CRNA         No complications documented      BP      Temp     Pulse     Resp      SpO2

## 2020-09-15 NOTE — H&P
H&P EXAM - Outpatient Endoscopy   Victorina Cobian 60 y o  male MRN: [id number]    [address] [facility]   Encounter: [id number]      History and Physical -  [facility]  Victorina Cobian 60 y o  male MRN: [id number]                  HPI: Victorina Cobian is a 60y o  year old male who presents for colonoscopy      REVIEW OF SYSTEMS: Per the HPI, and otherwise unremarkable      Historical Information   Past Medical History:   Diagnosis Date    Cellulitis of leg     LAST ASSESSED: 7/8/14    Contusion, hip     LAST ASSESSED: 7/8/14    Hypertension     not taking medications    Laceration of leg     LAST ASSESSED: 7/8/14    Pelvic hematoma, male     RESOLVED: 5/11/17    Rheumatoid arthritis(714 0)     Seizures (HCC)      Past Surgical History:   Procedure Laterality Date    ARTHROSCOPY KNEE Right     COLONOSCOPY      KNEE SURGERY       Social History   Social History     Substance and Sexual Activity   Alcohol Use Yes    Alcohol/week: 6 0 standard drinks    Types: 6 Cans of beer per week    Comment: OCCASIONAL     Social History     Substance and Sexual Activity   Drug Use No     Social History     Tobacco Use   Smoking Status Light Tobacco Smoker    Types: Cigars   Smokeless Tobacco Never Used     Family History   Problem Relation Age of Onset    Cancer Father     Heart disease Maternal Grandmother         CARDIAC DISORDER    Parkinsonism Paternal Grandfather        Meds/Allergies     (Not in a hospital admission)      Allergies   Allergen Reactions    Naproxen      Okay with ibuprofen    Pollen Extract        Objective     /94   Pulse 66   Temp (!) 97 4 °F (36 3 °C) (Tympanic)   Resp 18   Ht 6' 1" (1 854 m)   Wt 111 kg (244 lb)   SpO2 98%   BMI 32 19 kg/m²       PHYSICAL EXAM    Gen: NAD  CV: RRR  CHEST: Clear  ABD: soft, NT/ND  EXT: no edema      ASSESSMENT/PLAN:  This is a 60y o  year old male here for colonoscopy, and he is stable and optimized for his procedure

## 2020-09-16 NOTE — RESULT ENCOUNTER NOTE
My medical assistant will call patient with results  The  colon polyp removed was called an adenoma  This is a pre-cancerous lesion and was completely removed  There was no evidence of cancer in the polyp  The colon biopsy shows inflammation related to his diverticulitis  He should take the antibiotics as prescribed    Schedule office visit with a PA in one month   Repeat flex sig in 3 months

## 2020-09-18 ENCOUNTER — TELEPHONE (OUTPATIENT)
Dept: GASTROENTEROLOGY | Facility: CLINIC | Age: 65
End: 2020-09-18

## 2020-09-18 NOTE — TELEPHONE ENCOUNTER
Follow up appointment scheduled for next available 11/4 with Batsheva Rutledge  Flex Sig scheduled for 12/8/20 with Dr Evan Yip  Procedure packet sent in mail   Patient will contact office to go over all directions once packet is received     ----- Message from Moses Boo sent at 9/18/2020  9:09 AM EDT -----    ----- Message -----  From: Carina Phillips MD  Sent: 9/15/2020  12:50 PM EDT  To: [facility] [place]    Please schedule office visit with a PA in 4-6 weeks    Schedule flex sig in 3 months as well    Thanks

## 2020-09-20 ENCOUNTER — PREP FOR PROCEDURE (OUTPATIENT)
Dept: SURGERY | Facility: CLINIC | Age: 65
End: 2020-09-20

## 2020-09-20 DIAGNOSIS — R19.8 CHANGE IN BOWEL FUNCTION: Primary | ICD-10-CM

## 2020-09-22 ENCOUNTER — TRANSCRIBE ORDERS (OUTPATIENT)
Dept: ADMINISTRATIVE | Facility: HOSPITAL | Age: 65
End: 2020-09-22

## 2020-09-22 ENCOUNTER — APPOINTMENT (OUTPATIENT)
Dept: LAB | Facility: HOSPITAL | Age: 65
End: 2020-09-22
Payer: COMMERCIAL

## 2020-09-22 DIAGNOSIS — M79.10 MYALGIA: ICD-10-CM

## 2020-09-22 DIAGNOSIS — M79.10 MYALGIA: Primary | ICD-10-CM

## 2020-09-22 LAB
ALBUMIN SERPL BCP-MCNC: 3.6 G/DL (ref 3.5–5)
ALP SERPL-CCNC: 71 U/L (ref 46–116)
ALT SERPL W P-5'-P-CCNC: 62 U/L (ref 12–78)
ANION GAP SERPL CALCULATED.3IONS-SCNC: 8 MMOL/L (ref 4–13)
AST SERPL W P-5'-P-CCNC: 45 U/L (ref 5–45)
BILIRUB SERPL-MCNC: 0.6 MG/DL (ref 0.2–1)
BUN SERPL-MCNC: 11 MG/DL (ref 5–25)
CALCIUM SERPL-MCNC: 9 MG/DL (ref 8.3–10.1)
CHLORIDE SERPL-SCNC: 101 MMOL/L (ref 100–108)
CO2 SERPL-SCNC: 27 MMOL/L (ref 21–32)
CREAT SERPL-MCNC: 1.07 MG/DL (ref 0.6–1.3)
GFR SERPL CREATININE-BSD FRML MDRD: 73 ML/MIN/1.73SQ M
GLUCOSE SERPL-MCNC: 124 MG/DL (ref 65–140)
POTASSIUM SERPL-SCNC: 4.5 MMOL/L (ref 3.5–5.3)
PROT SERPL-MCNC: 7.2 G/DL (ref 6.4–8.2)
SODIUM SERPL-SCNC: 136 MMOL/L (ref 136–145)

## 2020-09-22 PROCEDURE — 36415 COLL VENOUS BLD VENIPUNCTURE: CPT

## 2020-09-22 PROCEDURE — 80053 COMPREHEN METABOLIC PANEL: CPT

## 2020-09-28 ENCOUNTER — TELEPHONE (OUTPATIENT)
Dept: GASTROENTEROLOGY | Facility: CLINIC | Age: 65
End: 2020-09-28

## 2020-09-28 NOTE — TELEPHONE ENCOUNTER
Patient left message stating he had a colonoscopy about 10 days ago and has finished his antibiotics  Patient wasn't sure if he should be given a new script  Please call patient to discuss   Thanks

## 2020-09-28 NOTE — TELEPHONE ENCOUNTER
Call to inquire if patient should continue on antibiotics  Estevan Bentley patient had colonoscopy 9/15/20 -left sided diverticulosis with surrounding erythema and edema of sigmoid colon/ pus within several diverticula  Cipro/ Flagyl x 10 days completed  Repeat colonoscopy in 3 months  Follow up office visit 11/4/20  Patient reports symptoms improving - abdomen soft, no pain or fever  Patient is aware no further antibiotics required  Follow up as scheduled

## 2020-11-04 ENCOUNTER — OFFICE VISIT (OUTPATIENT)
Dept: GASTROENTEROLOGY | Facility: MEDICAL CENTER | Age: 65
End: 2020-11-04
Payer: COMMERCIAL

## 2020-11-04 VITALS
SYSTOLIC BLOOD PRESSURE: 126 MMHG | DIASTOLIC BLOOD PRESSURE: 82 MMHG | HEART RATE: 76 BPM | WEIGHT: 228 LBS | BODY MASS INDEX: 30.08 KG/M2

## 2020-11-04 DIAGNOSIS — K57.92 DIVERTICULITIS: Primary | ICD-10-CM

## 2020-11-04 DIAGNOSIS — Z12.11 COLON CANCER SCREENING: ICD-10-CM

## 2020-11-04 PROCEDURE — 99213 OFFICE O/P EST LOW 20 MIN: CPT | Performed by: PHYSICIAN ASSISTANT

## 2020-11-04 PROCEDURE — 4004F PT TOBACCO SCREEN RCVD TLK: CPT | Performed by: PHYSICIAN ASSISTANT

## 2020-11-12 ENCOUNTER — TELEPHONE (OUTPATIENT)
Dept: FAMILY MEDICINE CLINIC | Facility: CLINIC | Age: 65
End: 2020-11-12

## 2020-11-12 DIAGNOSIS — M94.0 COSTOCHONDRITIS, ACUTE: Primary | ICD-10-CM

## 2020-11-13 ENCOUNTER — HOSPITAL ENCOUNTER (OUTPATIENT)
Dept: RADIOLOGY | Facility: HOSPITAL | Age: 65
Discharge: HOME | End: 2020-11-13
Attending: FAMILY MEDICINE
Payer: COMMERCIAL

## 2020-11-13 DIAGNOSIS — M94.0 COSTOCHONDRITIS, ACUTE: ICD-10-CM

## 2020-11-13 PROCEDURE — 71046 X-RAY EXAM CHEST 2 VIEWS: CPT

## 2020-11-14 DIAGNOSIS — R56.9 SEIZURE (HCC): ICD-10-CM

## 2020-11-15 DIAGNOSIS — R56.9 SEIZURE (HCC): ICD-10-CM

## 2020-11-15 DIAGNOSIS — I10 ESSENTIAL HYPERTENSION: ICD-10-CM

## 2020-11-16 ENCOUNTER — APPOINTMENT (OUTPATIENT)
Dept: RADIOLOGY | Facility: MEDICAL CENTER | Age: 65
End: 2020-11-16
Payer: COMMERCIAL

## 2020-11-16 ENCOUNTER — OFFICE VISIT (OUTPATIENT)
Dept: FAMILY MEDICINE CLINIC | Facility: CLINIC | Age: 65
End: 2020-11-16
Payer: COMMERCIAL

## 2020-11-16 ENCOUNTER — LAB (OUTPATIENT)
Dept: LAB | Facility: MEDICAL CENTER | Age: 65
End: 2020-11-16
Payer: COMMERCIAL

## 2020-11-16 VITALS
TEMPERATURE: 97.5 F | WEIGHT: 229.2 LBS | SYSTOLIC BLOOD PRESSURE: 152 MMHG | DIASTOLIC BLOOD PRESSURE: 84 MMHG | HEIGHT: 73 IN | BODY MASS INDEX: 30.38 KG/M2

## 2020-11-16 DIAGNOSIS — R07.81 PLEURITIC CHEST PAIN: ICD-10-CM

## 2020-11-16 DIAGNOSIS — I10 ESSENTIAL HYPERTENSION: ICD-10-CM

## 2020-11-16 DIAGNOSIS — R07.81 PLEURITIC CHEST PAIN: Primary | ICD-10-CM

## 2020-11-16 LAB — D DIMER PPP FEU-MCNC: 0.57 UG/ML FEU

## 2020-11-16 PROCEDURE — 3079F DIAST BP 80-89 MM HG: CPT | Performed by: FAMILY MEDICINE

## 2020-11-16 PROCEDURE — 36415 COLL VENOUS BLD VENIPUNCTURE: CPT

## 2020-11-16 PROCEDURE — 3008F BODY MASS INDEX DOCD: CPT | Performed by: FAMILY MEDICINE

## 2020-11-16 PROCEDURE — 99213 OFFICE O/P EST LOW 20 MIN: CPT | Performed by: FAMILY MEDICINE

## 2020-11-16 PROCEDURE — 4004F PT TOBACCO SCREEN RCVD TLK: CPT | Performed by: FAMILY MEDICINE

## 2020-11-16 PROCEDURE — 71100 X-RAY EXAM RIBS UNI 2 VIEWS: CPT

## 2020-11-16 PROCEDURE — 3077F SYST BP >= 140 MM HG: CPT | Performed by: FAMILY MEDICINE

## 2020-11-16 PROCEDURE — 85379 FIBRIN DEGRADATION QUANT: CPT

## 2020-11-16 RX ORDER — LISINOPRIL 5 MG/1
5 TABLET ORAL DAILY
Qty: 30 TABLET | Refills: 0 | Status: SHIPPED | OUTPATIENT
Start: 2020-11-16 | End: 2020-12-21 | Stop reason: SDUPTHER

## 2020-11-16 RX ORDER — LEVETIRACETAM 500 MG/1
TABLET ORAL
Qty: 60 TABLET | Refills: 11 | Status: SHIPPED | OUTPATIENT
Start: 2020-11-16 | End: 2021-01-29 | Stop reason: SDUPTHER

## 2020-11-16 RX ORDER — LEVETIRACETAM 500 MG/1
500 TABLET ORAL 2 TIMES DAILY
Qty: 60 TABLET | Refills: 0 | OUTPATIENT
Start: 2020-11-16

## 2020-12-03 ENCOUNTER — TELEPHONE (OUTPATIENT)
Dept: GASTROENTEROLOGY | Facility: CLINIC | Age: 65
End: 2020-12-03

## 2020-12-03 ENCOUNTER — TELEPHONE (OUTPATIENT)
Dept: SURGERY | Facility: CLINIC | Age: 65
End: 2020-12-03

## 2020-12-08 ENCOUNTER — HOSPITAL ENCOUNTER (OUTPATIENT)
Dept: PERIOP | Facility: HOSPITAL | Age: 65
Setting detail: OUTPATIENT SURGERY
Discharge: HOME | End: 2020-12-08
Attending: INTERNAL MEDICINE | Admitting: INTERNAL MEDICINE
Payer: COMMERCIAL

## 2020-12-08 ENCOUNTER — ANESTHESIA (OUTPATIENT)
Dept: PERIOP | Facility: HOSPITAL | Age: 65
End: 2020-12-08

## 2020-12-08 ENCOUNTER — ANESTHESIA EVENT (OUTPATIENT)
Dept: PERIOP | Facility: HOSPITAL | Age: 65
End: 2020-12-08

## 2020-12-08 VITALS
HEIGHT: 73 IN | WEIGHT: 230 LBS | TEMPERATURE: 97.8 F | RESPIRATION RATE: 20 BRPM | SYSTOLIC BLOOD PRESSURE: 136 MMHG | BODY MASS INDEX: 30.48 KG/M2 | DIASTOLIC BLOOD PRESSURE: 75 MMHG | OXYGEN SATURATION: 98 % | HEART RATE: 85 BPM

## 2020-12-08 VITALS — HEART RATE: 78 BPM

## 2020-12-08 DIAGNOSIS — R19.8 CHANGE IN BOWEL FUNCTION: ICD-10-CM

## 2020-12-08 PROCEDURE — 88305 TISSUE EXAM BY PATHOLOGIST: CPT | Performed by: PATHOLOGY

## 2020-12-08 PROCEDURE — 45331 SIGMOIDOSCOPY AND BIOPSY: CPT | Performed by: INTERNAL MEDICINE

## 2020-12-08 RX ORDER — LIDOCAINE HYDROCHLORIDE 10 MG/ML
INJECTION, SOLUTION EPIDURAL; INFILTRATION; INTRACAUDAL; PERINEURAL AS NEEDED
Status: DISCONTINUED | OUTPATIENT
Start: 2020-12-08 | End: 2020-12-08

## 2020-12-08 RX ORDER — ONDANSETRON 2 MG/ML
4 INJECTION INTRAMUSCULAR; INTRAVENOUS ONCE AS NEEDED
Status: DISCONTINUED | OUTPATIENT
Start: 2020-12-08 | End: 2020-12-12 | Stop reason: HOSPADM

## 2020-12-08 RX ORDER — PROPOFOL 10 MG/ML
INJECTION, EMULSION INTRAVENOUS CONTINUOUS PRN
Status: DISCONTINUED | OUTPATIENT
Start: 2020-12-08 | End: 2020-12-08

## 2020-12-08 RX ORDER — SODIUM CHLORIDE, SODIUM LACTATE, POTASSIUM CHLORIDE, CALCIUM CHLORIDE 600; 310; 30; 20 MG/100ML; MG/100ML; MG/100ML; MG/100ML
125 INJECTION, SOLUTION INTRAVENOUS CONTINUOUS
Status: DISCONTINUED | OUTPATIENT
Start: 2020-12-08 | End: 2020-12-12 | Stop reason: HOSPADM

## 2020-12-08 RX ORDER — PROPOFOL 10 MG/ML
INJECTION, EMULSION INTRAVENOUS AS NEEDED
Status: DISCONTINUED | OUTPATIENT
Start: 2020-12-08 | End: 2020-12-08

## 2020-12-08 RX ADMIN — PROPOFOL 110 MCG/KG/MIN: 10 INJECTION, EMULSION INTRAVENOUS at 10:23

## 2020-12-08 RX ADMIN — PROPOFOL 50 MG: 10 INJECTION, EMULSION INTRAVENOUS at 10:23

## 2020-12-08 RX ADMIN — SODIUM CHLORIDE, SODIUM LACTATE, POTASSIUM CHLORIDE, AND CALCIUM CHLORIDE 125 ML/HR: .6; .31; .03; .02 INJECTION, SOLUTION INTRAVENOUS at 09:51

## 2020-12-08 RX ADMIN — LIDOCAINE HYDROCHLORIDE 30 MG: 10 INJECTION, SOLUTION EPIDURAL; INFILTRATION; INTRACAUDAL; PERINEURAL at 10:22

## 2020-12-08 RX ADMIN — PROPOFOL 100 MG: 10 INJECTION, EMULSION INTRAVENOUS at 10:22

## 2020-12-21 DIAGNOSIS — I10 ESSENTIAL HYPERTENSION: ICD-10-CM

## 2020-12-21 RX ORDER — LISINOPRIL 5 MG/1
5 TABLET ORAL DAILY
Qty: 90 TABLET | Refills: 1 | Status: SHIPPED | OUTPATIENT
Start: 2020-12-21 | End: 2021-07-21

## 2021-01-29 ENCOUNTER — OFFICE VISIT (OUTPATIENT)
Dept: NEUROLOGY | Facility: CLINIC | Age: 66
End: 2021-01-29
Payer: COMMERCIAL

## 2021-01-29 VITALS
HEIGHT: 73 IN | SYSTOLIC BLOOD PRESSURE: 118 MMHG | BODY MASS INDEX: 30.35 KG/M2 | WEIGHT: 229 LBS | HEART RATE: 72 BPM | DIASTOLIC BLOOD PRESSURE: 70 MMHG

## 2021-01-29 DIAGNOSIS — R56.9 SEIZURE (HCC): ICD-10-CM

## 2021-01-29 DIAGNOSIS — G40.909 NONINTRACTABLE EPILEPSY WITHOUT STATUS EPILEPTICUS, UNSPECIFIED EPILEPSY TYPE (HCC): ICD-10-CM

## 2021-01-29 PROCEDURE — 99214 OFFICE O/P EST MOD 30 MIN: CPT | Performed by: PSYCHIATRY & NEUROLOGY

## 2021-01-29 RX ORDER — LEVETIRACETAM 500 MG/1
TABLET ORAL
Qty: 90 TABLET | Refills: 11 | Status: SHIPPED | OUTPATIENT
Start: 2021-01-29 | End: 2022-02-07 | Stop reason: SDUPTHER

## 2021-01-29 NOTE — ASSESSMENT & PLAN NOTE
He has not had any additional definite seizures since his last   He has been having some odd nighttime episodes where he will get an odd sensation and then have a "stiffening up" which does not have any alteration of consciousness or loss of consciousness associated with it  This is a new sensation, and he reports maybe somewhat similar to things he has experienced in the past   Is unclear if these represent focal aware seizures or not  Either way he is on a very low dose of levetiracetam and these are occurring to just at night, so it may be reasonable to try to go up on his dose of medications slightly  -- I will have him increase his levetiracetam to be 500 milligrams in the morning 1000 milligrams at night  Hopefully increasing his evening dose will help alleviate these nighttime episodes  -- at this point I would not recommend any other tests at this point  His episodes are infrequent enough that may be difficult to capture them on EEG

## 2021-01-29 NOTE — PATIENT INSTRUCTIONS
-- Increase Levetiracetam (Keppra) to be 500 mg (1 tab) in the morning and 1000 mg (2 tabs) nightly  -- you can go to [employer] to get coupons for discounted medications

## 2021-01-29 NOTE — PROGRESS NOTES
Patient ID: Sofiya Cam is a 60 y o  male with hypertension, hyperlipidemia, and localization related epilepsy, who is returning to Neurology office for follow up of his seizures  Assessment/Plan:    Epilepsy ([address] )    He has not had any additional definite seizures since his last   He has been having some odd nighttime episodes where he will get an odd sensation and then have a "stiffening up" which does not have any alteration of consciousness or loss of consciousness associated with it  This is a new sensation, and he reports maybe somewhat similar to things he has experienced in the past   Is unclear if these represent focal aware seizures or not  Either way he is on a very low dose of levetiracetam and these are occurring to just at night, so it may be reasonable to try to go up on his dose of medications slightly  -- I will have him increase his levetiracetam to be 500 milligrams in the morning 1000 milligrams at night  Hopefully increasing his evening dose will help alleviate these nighttime episodes  -- at this point I would not recommend any other tests at this point  His episodes are infrequent enough that may be difficult to capture them on EEG  He will Return in about 3 months (around 4/29/2021)  Subjective:    HPI  Current seizure medications:  1  Levetiracetam 500 mg twice a day  Other medications as per Epic  Since his last visit, there have been a few times when he is laying down and he gets an odd feeling, like a taste in his mouth, then will feel like he tightens up, but is still awake and this doesn't progress any further  This has happened perhaps once every 2-3 months  Prior Seizure Medications: Levetiracetam (stopped due to prolonged period of seizure freedom)    His history was also obtained from his friend, who was present at today's visit       The following portions of the patient's history were reviewed and updated as appropriate: allergies, current medications, past medical history and problem list      Objective:    Blood pressure 118/70, pulse 72, height 6' 1" (1 854 m), weight 104 kg (229 lb)  Physical Exam    Neurological Exam      ROS:    Review of Systems   Constitutional: Negative  Negative for appetite change and fever  HENT: Negative  Negative for hearing loss, tinnitus, trouble swallowing and voice change  Eyes: Negative  Negative for photophobia and pain  Respiratory: Negative  Negative for shortness of breath  Cardiovascular: Negative  Negative for palpitations  Gastrointestinal: Negative  Negative for nausea and vomiting  Endocrine: Negative  Negative for cold intolerance  Genitourinary: Negative  Negative for dysuria, frequency and urgency  Musculoskeletal: Negative  Negative for myalgias and neck pain  Skin: Negative  Negative for rash  Neurological: Negative  Negative for dizziness, tremors, seizures, syncope, facial asymmetry, speech difficulty, weakness, light-headedness, numbness and headaches  Hematological: Negative  Does not bruise/bleed easily  Psychiatric/Behavioral: Negative  Negative for confusion, hallucinations and sleep disturbance         I personally reviewed the review of systems that was entered by the medical assistant

## 2021-03-10 DIAGNOSIS — Z23 ENCOUNTER FOR IMMUNIZATION: ICD-10-CM

## 2021-06-22 ENCOUNTER — TELEPHONE (OUTPATIENT)
Dept: NEUROLOGY | Facility: CLINIC | Age: 66
End: 2021-06-22

## 2021-06-29 ENCOUNTER — TELEPHONE (OUTPATIENT)
Dept: NEUROLOGY | Facility: CLINIC | Age: 66
End: 2021-06-29

## 2021-07-01 NOTE — PROGRESS NOTES
Patient ID: Margarita Alejandro is a 60 y o  male with hypertension, hyperlipidemia, and localization related epilepsy, who is returning to Neurology office for follow up of his seizures  Assessment/Plan:    Epilepsy ([address] )   He has not had any additional seizures since his last appointment, but has been occasionally missing some of his doses of medicines at night, mainly because he is taking his evening dose slipped a few hours apart  I discussed that there is no need for him to take his medications at 3 different times, and he should just take 500 mg in the morning and his full dose of 1000 mg all at 1 time at night  This will help assure his compliance  -- he will continue to take levetiracetam 500 mg in the morning and 1000 mg at night  If he would have any additional nocturnal events or other episodes concerning for seizures, his dose could be increased further  He will Return in about 6 months (around 1/2/2022)  Subjective:    HPI  Current seizure medications:  1  Levetiracetam 500 mg in the am and 1000 mg at night  Other medications as per Epic  Since his last visit, he has not had any additional seizures since his last appointment  He denies any side effects or change in his mood  He may have noted a little change in his appetite, being a little decreased and he lost a little weight, but not a concerning amount  He did have one episode of his muscles tightening about 1-2 months ago  He typically gets 8 hours of sleep at night  He does note that he takes his evening doses of medication slid, often taking one 500 mg tab earlier in the evening and the other tablet a few hours later  Because of when he goes to bed, he will often forget the second tab    Prior Seizure Medications: none other than current meds  Objective:    Blood pressure 122/62, height 6' 1" (1 854 m), weight 99 8 kg (220 lb)      Physical Exam    Neurological Exam      ROS:    Review of Systems Constitutional: Positive for appetite change  Negative for fever  HENT: Negative  Negative for hearing loss, tinnitus, trouble swallowing and voice change  Eyes: Negative  Negative for photophobia and pain  Respiratory: Negative  Negative for shortness of breath  Cardiovascular: Negative  Negative for palpitations  Gastrointestinal: Negative  Negative for nausea and vomiting  Endocrine: Negative  Negative for cold intolerance  Genitourinary: Negative  Negative for dysuria, frequency and urgency  Musculoskeletal: Negative  Negative for myalgias and neck pain  Skin: Negative  Negative for rash  Neurological: Negative  Negative for dizziness, tremors, seizures, syncope, facial asymmetry, speech difficulty, weakness, light-headedness, numbness and headaches  Hematological: Negative  Does not bruise/bleed easily  Psychiatric/Behavioral: Negative  Negative for confusion, hallucinations and sleep disturbance  All other systems reviewed and are negative        I personally reviewed the ROS that was entered by the medical assistant

## 2021-07-02 ENCOUNTER — OFFICE VISIT (OUTPATIENT)
Dept: NEUROLOGY | Facility: CLINIC | Age: 66
End: 2021-07-02
Payer: COMMERCIAL

## 2021-07-02 VITALS
SYSTOLIC BLOOD PRESSURE: 122 MMHG | HEIGHT: 73 IN | BODY MASS INDEX: 29.16 KG/M2 | WEIGHT: 220 LBS | DIASTOLIC BLOOD PRESSURE: 62 MMHG

## 2021-07-02 DIAGNOSIS — G40.909 NONINTRACTABLE EPILEPSY WITHOUT STATUS EPILEPTICUS, UNSPECIFIED EPILEPSY TYPE (HCC): Primary | ICD-10-CM

## 2021-07-02 PROCEDURE — 99214 OFFICE O/P EST MOD 30 MIN: CPT | Performed by: PSYCHIATRY & NEUROLOGY

## 2021-07-02 PROCEDURE — 4004F PT TOBACCO SCREEN RCVD TLK: CPT | Performed by: PSYCHIATRY & NEUROLOGY

## 2021-07-02 PROCEDURE — 3008F BODY MASS INDEX DOCD: CPT | Performed by: PSYCHIATRY & NEUROLOGY

## 2021-07-02 NOTE — PATIENT INSTRUCTIONS
-- Please continue to take Levetiracetam 500 mg in the morning and 1000 mg at night  I would recommend taking both nighttime tablets at the same time so that you don't forget the doses

## 2021-07-05 NOTE — ASSESSMENT & PLAN NOTE
He has not had any additional seizures since his last appointment, but has been occasionally missing some of his doses of medicines at night, mainly because he is taking his evening dose slipped a few hours apart  I discussed that there is no need for him to take his medications at 3 different times, and he should just take 500 mg in the morning and his full dose of 1000 mg all at 1 time at night  This will help assure his compliance  -- he will continue to take levetiracetam 500 mg in the morning and 1000 mg at night  If he would have any additional nocturnal events or other episodes concerning for seizures, his dose could be increased further

## 2021-07-21 DIAGNOSIS — I10 ESSENTIAL HYPERTENSION: ICD-10-CM

## 2021-07-21 RX ORDER — LISINOPRIL 5 MG/1
TABLET ORAL
Qty: 90 TABLET | Refills: 1 | Status: SHIPPED | OUTPATIENT
Start: 2021-07-21 | End: 2022-01-26 | Stop reason: SDUPTHER

## 2022-01-26 DIAGNOSIS — I10 ESSENTIAL HYPERTENSION: ICD-10-CM

## 2022-01-26 RX ORDER — LISINOPRIL 5 MG/1
5 TABLET ORAL DAILY
Qty: 90 TABLET | Refills: 0 | Status: SHIPPED | OUTPATIENT
Start: 2022-01-26 | End: 2022-04-25 | Stop reason: SDUPTHER

## 2022-02-02 ENCOUNTER — OFFICE VISIT (OUTPATIENT)
Dept: FAMILY MEDICINE CLINIC | Facility: CLINIC | Age: 67
End: 2022-02-02
Payer: MEDICARE

## 2022-02-02 VITALS
BODY MASS INDEX: 31.14 KG/M2 | HEIGHT: 73 IN | SYSTOLIC BLOOD PRESSURE: 138 MMHG | DIASTOLIC BLOOD PRESSURE: 94 MMHG | TEMPERATURE: 97.2 F | WEIGHT: 235 LBS

## 2022-02-02 DIAGNOSIS — E78.2 MIXED HYPERLIPIDEMIA: ICD-10-CM

## 2022-02-02 DIAGNOSIS — R53.82 CHRONIC FATIGUE: ICD-10-CM

## 2022-02-02 DIAGNOSIS — I10 ESSENTIAL HYPERTENSION: Primary | ICD-10-CM

## 2022-02-02 DIAGNOSIS — M25.511 PAIN OF BOTH SHOULDER JOINTS: ICD-10-CM

## 2022-02-02 DIAGNOSIS — G40.909 NONINTRACTABLE EPILEPSY WITHOUT STATUS EPILEPTICUS, UNSPECIFIED EPILEPSY TYPE (HCC): ICD-10-CM

## 2022-02-02 DIAGNOSIS — M25.512 PAIN OF BOTH SHOULDER JOINTS: ICD-10-CM

## 2022-02-02 PROCEDURE — 99214 OFFICE O/P EST MOD 30 MIN: CPT | Performed by: FAMILY MEDICINE

## 2022-02-02 NOTE — PROGRESS NOTES
Assessment/Plan:    Problem List Items Addressed This Visit        Cardiovascular and Mediastinum    Essential hypertension - Primary       Nervous and Auditory    Epile[address] [address] )       Other    Hyperlipidemia           Diagnoses and all orders for this visit:    Essential hypertension    Nonintractable epilepsy without status epilepticus, unspecified epilepsy type ([facility] [address] )    Mixed hyperlipidemia        No problem-specific Assessment & Plan notes found for this encounter  Subjective:      Patient ID: Demetris Hager is a 73 y o  male  Followup on bp, epilepsy      The following portions of the patient's history were reviewed and updated as appropriate:   He has a past medical history of Cellulitis of leg, Contusion, hip, Hypertension, Laceration of leg, Pelvic hematoma, male, Rheumatoid arthritis(714 0), and Seizures ([facility] [address] )  ,  does not have any pertinent problems on file  ,   has a past surgical history that includes Knee surgery; ARTHROSCOPY KNEE (Right); and Colonoscopy  ,  family history includes Cancer in his father; Heart disease in his maternal grandmother; Parkinsonism in his paternal grandfather  ,   reports that he has been smoking cigars  He has never used smokeless tobacco  He reports current alcohol use of about 6 0 standard drinks of alcohol per week  He reports that he does not use drugs  ,  is allergic to naproxen and pollen extract     Current Outpatient Medications   Medication Sig Dispense Refill    levETIRAcetam (KEPPRA) 500 mg tablet Take 500 mg (1 tab) in the morning and 1000 mg (2 tabs) nightly 90 tablet 11    lisinopril (ZESTRIL) 5 mg tablet Take 1 tablet (5 mg total) by mouth daily 90 tablet 0     No current facility-administered medications for this visit  Review of Systems   Constitutional: Negative for activity change, appetite change, diaphoresis, fatigue and fever  HENT: Positive for dental problem  Eyes: Negative      Respiratory: Negative for apnea, cough, chest tightness, shortness of breath and wheezing  Cardiovascular: Negative for chest pain, palpitations and leg swelling  Gastrointestinal: Negative for abdominal distention, abdominal pain, anal bleeding, constipation, diarrhea, nausea and vomiting  Endocrine: Negative for cold intolerance, heat intolerance, polydipsia, polyphagia and polyuria  Genitourinary: Negative for difficulty urinating, dysuria, flank pain, hematuria and urgency  Musculoskeletal: Positive for arthralgias  Negative for back pain, gait problem, joint swelling and myalgias  Skin: Negative for color change, rash and wound  Allergic/Immunologic: Negative for environmental allergies, food allergies and immunocompromised state  Neurological: Positive for seizures  Negative for dizziness, syncope, speech difficulty, numbness and headaches  Hematological: Negative for adenopathy  Does not bruise/bleed easily  Psychiatric/Behavioral: Negative for agitation, behavioral problems, hallucinations, sleep disturbance and suicidal ideas  Objective:  Vitals:    02/02/22 1106   BP: 138/94   BP Location: Left arm   Patient Position: Sitting   Cuff Size: Large   Temp: (!) 97 2 °F (36 2 °C)   TempSrc: Temporal   Weight: 107 kg (235 lb)   Height: 6' 1" (1 854 m)     Body mass index is 31 kg/m²  Physical Exam  Constitutional:       Appearance: He is obese  HENT:      Nose: Congestion present  Pulmonary:      Effort: Pulmonary effort is normal    Skin:     General: Skin is warm and dry  Neurological:      General: No focal deficit present  Mental Status: He is alert and oriented to person, place, and time     Psychiatric:         Mood and Affect: Mood normal          Behavior: Behavior normal

## 2022-02-07 DIAGNOSIS — R56.9 SEIZURE (HCC): ICD-10-CM

## 2022-02-07 RX ORDER — LEVETIRACETAM 500 MG/1
TABLET ORAL
Qty: 90 TABLET | Refills: 11 | Status: SHIPPED | OUTPATIENT
Start: 2022-02-07 | End: 2022-03-16 | Stop reason: SDUPTHER

## 2022-02-09 ENCOUNTER — APPOINTMENT (OUTPATIENT)
Dept: LAB | Facility: MEDICAL CENTER | Age: 67
End: 2022-02-09
Payer: MEDICARE

## 2022-02-09 DIAGNOSIS — E78.2 MIXED HYPERLIPIDEMIA: ICD-10-CM

## 2022-02-09 DIAGNOSIS — M25.511 PAIN OF BOTH SHOULDER JOINTS: ICD-10-CM

## 2022-02-09 DIAGNOSIS — I10 ESSENTIAL HYPERTENSION: ICD-10-CM

## 2022-02-09 DIAGNOSIS — M25.512 PAIN OF BOTH SHOULDER JOINTS: ICD-10-CM

## 2022-02-09 LAB
ALBUMIN SERPL BCP-MCNC: 3.6 G/DL (ref 3.5–5)
ALP SERPL-CCNC: 74 U/L (ref 46–116)
ALT SERPL W P-5'-P-CCNC: 32 U/L (ref 12–78)
ANION GAP SERPL CALCULATED.3IONS-SCNC: 7 MMOL/L (ref 4–13)
AST SERPL W P-5'-P-CCNC: 23 U/L (ref 5–45)
BILIRUB SERPL-MCNC: 1.6 MG/DL (ref 0.2–1)
BUN SERPL-MCNC: 11 MG/DL (ref 5–25)
CALCIUM SERPL-MCNC: 9.3 MG/DL (ref 8.3–10.1)
CHLORIDE SERPL-SCNC: 104 MMOL/L (ref 100–108)
CHOLEST SERPL-MCNC: 185 MG/DL
CO2 SERPL-SCNC: 24 MMOL/L (ref 21–32)
CREAT SERPL-MCNC: 0.98 MG/DL (ref 0.6–1.3)
GFR SERPL CREATININE-BSD FRML MDRD: 80 ML/MIN/1.73SQ M
GLUCOSE P FAST SERPL-MCNC: 96 MG/DL (ref 65–99)
HDLC SERPL-MCNC: 50 MG/DL
LDLC SERPL CALC-MCNC: 113 MG/DL (ref 0–100)
NONHDLC SERPL-MCNC: 135 MG/DL
POTASSIUM SERPL-SCNC: 4.1 MMOL/L (ref 3.5–5.3)
PROT SERPL-MCNC: 7.3 G/DL (ref 6.4–8.2)
SODIUM SERPL-SCNC: 135 MMOL/L (ref 136–145)
TRIGL SERPL-MCNC: 112 MG/DL
TSH SERPL DL<=0.05 MIU/L-ACNC: 1.69 UIU/ML (ref 0.36–3.74)

## 2022-02-09 PROCEDURE — 84443 ASSAY THYROID STIM HORMONE: CPT

## 2022-02-09 PROCEDURE — 80053 COMPREHEN METABOLIC PANEL: CPT

## 2022-02-09 PROCEDURE — 36415 COLL VENOUS BLD VENIPUNCTURE: CPT

## 2022-02-09 PROCEDURE — 80061 LIPID PANEL: CPT | Performed by: FAMILY MEDICINE

## 2022-02-21 ENCOUNTER — TELEPHONE (OUTPATIENT)
Dept: NEUROLOGY | Facility: CLINIC | Age: 67
End: 2022-02-21

## 2022-02-21 NOTE — TELEPHONE ENCOUNTER
Saqib called & states that the weather will be bad and icy on Friday & does not want to drive this far for his appointment   He asked for a virtual [facility]

## 2022-02-25 ENCOUNTER — TELEMEDICINE (OUTPATIENT)
Dept: NEUROLOGY | Facility: CLINIC | Age: 67
End: 2022-02-25
Payer: MEDICARE

## 2022-02-25 DIAGNOSIS — G40.909 NONINTRACTABLE EPILEPSY WITHOUT STATUS EPILEPTICUS, UNSPECIFIED EPILEPSY TYPE (HCC): Primary | ICD-10-CM

## 2022-02-25 PROCEDURE — 99214 OFFICE O/P EST MOD 30 MIN: CPT | Performed by: PSYCHIATRY & NEUROLOGY

## 2022-02-25 NOTE — PATIENT INSTRUCTIONS
-- Please get an MRI of your brain and an EEG before your next appointment       -- If you have any more episodes, give our office a call since we may want to increase your dose of Levetiracetam

## 2022-02-25 NOTE — PROGRESS NOTES
Virtual Regular Visit    Verification of patient location:    Patient is located in the following state in which I hold an active license [place]      Assessment/Plan:    Patient ID: Freddie Roman is a 73 y o  male with hypertension, hyperlipidemia, and localization related epilepsy, who is returning for follow-up of his seizures  Assessment/Plan:    Epilepsy ([facility] [address] )  He has not had any definite seizures, but has had some episodes of feeling faint and as leg cramping  These episodes are different from his prior seizures, and have not occurred since Christmas  I would like to better evaluate these episodes, I will have him get an MRI of his brain and a routine EEG  If he continues to have events, he may benefit from additional EEGs or prolonged EEGs  -- he will continue Levetiracetam unchanged for now  If he would have additional events, we may do further testing as above versus increasing Levetiracetam further  He will Return in about 2 months (around 4/25/2022)  Reason for visit is No chief complaint on file  Encounter provider Rossy Marley MD    Provider located at [address] [id number]      Recent Visits  No visits were found meeting these conditions  Showing recent visits within past 7 days and meeting all other requirements  Future Appointments  No visits were found meeting these conditions  Showing future appointments within next 150 days and meeting all other requirements       The patient was identified by name and date of birth  Freddie Roman was informed that this is a telemedicine visit and that the visit is being conducted through [facility] and patient was informed this is a secure, HIPAA-complaint platform  He agrees to proceed     My office door was closed  No one else was in the room  He acknowledged consent and understanding of privacy and security of the video platform   The patient has agreed to participate and understands they can discontinue the visit at any time  Patient is aware this is a billable service  Subjective  Haritha Harris is a 73 y o  male     Current seizure medications:  1  Levetiracetam 500 mg in the am and 1000 mg at night  Other medications as per Epic  Since his last visit, he reports events where he will get a faint feeling  Around [place], he had an episode after getting out of the shower where he felt faint and his leg gave out and he then reported that he may have some cramping in his right leg  He didn't have any loss of consciousness  This hasn't happened since [place]  He will notice that if he is laying a certain way at night and will roll over, he will get an odd feeling  He will then get a cramp in his right arm  These are all different from what he had experienced in the past      Prior Medications: none other than current medications         HPI     Past Medical History:   Diagnosis Date    Cellulitis of leg     LAST ASSESSED: 7/8/14    Contusion, hip     LAST ASSESSED: 7/8/14    Hypertension     not taking medications    Laceration of leg     LAST ASSESSED: 7/8/14    Pelvic hematoma, male     RESOLVED: 5/11/17    Rheumatoid arthritis(714 0)     Seizures (HCC)        Past Surgical History:   Procedure Laterality Date    ARTHROSCOPY KNEE Right     COLONOSCOPY      KNEE SURGERY         Current Outpatient Medications   Medication Sig Dispense Refill    levETIRAcetam (KEPPRA) 500 mg tablet Take 500 mg (1 tab) in the morning and 1000 mg (2 tabs) nightly 90 tablet 11    lisinopril (ZESTRIL) 5 mg tablet Take 1 tablet (5 mg total) by mouth daily 90 tablet 0     No current facility-administered medications for this visit  Allergies   Allergen Reactions    Naproxen      Okay with ibuprofen    Pollen Extract        Review of Systems    Video Exam    There were no vitals filed for this visit      Physical Exam     I spent 25 minutes directly with the patient during this visit    VIRTUAL VISIT DISCLAIMER      Amaya Rush verbally agrees to participate in [employer]  Pt is aware that [employer] could be limited without vital signs or the ability to perform a full hands-on physical exam  Adriel Florian understands he or the provider may request at any time to terminate the video visit and request the patient to seek care or treatment in person

## 2022-02-28 ENCOUNTER — HOSPITAL ENCOUNTER (OUTPATIENT)
Dept: MRI IMAGING | Facility: HOSPITAL | Age: 67
Discharge: HOME | End: 2022-02-28
Attending: PSYCHIATRY & NEUROLOGY
Payer: MEDICARE

## 2022-02-28 ENCOUNTER — TELEPHONE (OUTPATIENT)
Dept: NEUROLOGY | Facility: CLINIC | Age: 67
End: 2022-02-28

## 2022-02-28 DIAGNOSIS — G40.909 NONINTRACTABLE EPILEPSY WITHOUT STATUS EPILEPTICUS, UNSPECIFIED EPILEPSY TYPE (HCC): ICD-10-CM

## 2022-02-28 PROCEDURE — 70553 MRI BRAIN STEM W/O & W/DYE: CPT

## 2022-02-28 PROCEDURE — G1004 CDSM NDSC: HCPCS

## 2022-02-28 PROCEDURE — A9585 GADOBUTROL INJECTION: HCPCS | Performed by: PSYCHIATRY & NEUROLOGY

## 2022-02-28 RX ADMIN — GADOBUTROL 10 ML: 604.72 INJECTION INTRAVENOUS at 12:33

## 2022-02-28 NOTE — TELEPHONE ENCOUNTER
Patient returned call, patient accepted appointment on 4/21/22 at 12p in [facility]  approximately one week after EEG (4/14/22)

## 2022-02-28 NOTE — TELEPHONE ENCOUNTER
LVM regarding scheduling follow up appointment with Dr Guillermo Bal  Per Dr Guillermo Bal he wants to see patient a week after MRI and EEG is completed  I went ahead and seen MRI is scheduled for 2/28/22 and EEG is scheduled on 4/14/22

## 2022-03-07 ENCOUNTER — TELEPHONE (OUTPATIENT)
Dept: NEUROLOGY | Facility: CLINIC | Age: 67
End: 2022-03-07

## 2022-03-07 NOTE — TELEPHONE ENCOUNTER
We were able to get Mr Milena Aguilar EEG appointment moved up to this week  Rob Dillard, can you move up his appointment?  I could see him on 3/16 at noon in [facility]

## 2022-03-09 NOTE — ASSESSMENT & PLAN NOTE
He has not had any definite seizures, but has had some episodes of feeling faint and as leg cramping  These episodes are different from his prior seizures, and have not occurred since Christmas  I would like to better evaluate these episodes, I will have him get an MRI of his brain and a routine EEG  If he continues to have events, he may benefit from additional EEGs or prolonged EEGs  -- he will continue Levetiracetam unchanged for now  If he would have additional events, we may do further testing as above versus increasing Levetiracetam further

## 2022-03-10 ENCOUNTER — HOSPITAL ENCOUNTER (OUTPATIENT)
Dept: NEUROLOGY | Facility: HOSPITAL | Age: 67
Discharge: HOME | End: 2022-03-10
Attending: PSYCHIATRY & NEUROLOGY
Payer: MEDICARE

## 2022-03-10 ENCOUNTER — TELEPHONE (OUTPATIENT)
Dept: NEUROLOGY | Facility: CLINIC | Age: 67
End: 2022-03-10

## 2022-03-10 DIAGNOSIS — G40.909 NONINTRACTABLE EPILEPSY WITHOUT STATUS EPILEPTICUS, UNSPECIFIED EPILEPSY TYPE (HCC): ICD-10-CM

## 2022-03-10 PROCEDURE — 95816 EEG AWAKE AND DROWSY: CPT

## 2022-03-10 PROCEDURE — 95816 EEG AWAKE AND DROWSY: CPT | Performed by: PSYCHIATRY & NEUROLOGY

## 2022-03-16 ENCOUNTER — OFFICE VISIT (OUTPATIENT)
Dept: NEUROLOGY | Facility: CLINIC | Age: 67
End: 2022-03-16
Payer: MEDICARE

## 2022-03-16 VITALS
HEIGHT: 73 IN | WEIGHT: 233 LBS | BODY MASS INDEX: 30.88 KG/M2 | SYSTOLIC BLOOD PRESSURE: 120 MMHG | DIASTOLIC BLOOD PRESSURE: 68 MMHG

## 2022-03-16 DIAGNOSIS — G40.909 NONINTRACTABLE EPILEPSY WITHOUT STATUS EPILEPTICUS, UNSPECIFIED EPILEPSY TYPE (HCC): Primary | ICD-10-CM

## 2022-03-16 DIAGNOSIS — R56.9 SEIZURE (HCC): ICD-10-CM

## 2022-03-16 PROCEDURE — 99214 OFFICE O/P EST MOD 30 MIN: CPT | Performed by: PSYCHIATRY & NEUROLOGY

## 2022-03-16 RX ORDER — LEVETIRACETAM 500 MG/1
TABLET ORAL
Qty: 120 TABLET | Refills: 11 | Status: SHIPPED | OUTPATIENT
Start: 2022-03-16

## 2022-03-16 RX ORDER — ASPIRIN 325 MG
325 TABLET ORAL DAILY
COMMUNITY

## 2022-03-16 NOTE — ASSESSMENT & PLAN NOTE
Overall, he has not had any definite seizures since his last appointment, but has been having some odd episodes where he will get a weird taste in his mouth and a feeling of tightness  These have largely been unwitnessed, a does not have any clear loss of consciousness or alteration of consciousness with these episodes  He did have a fall in December which it is unclear if this was related to 1 of these exact types of episodes or not, but since these episodes are still occurring, it may be best for us to increase his dose of levetiracetam to try to make the events stop  --I will have him increase levetiracetam to be 1000 mg twice a day  --if he continues to have episodes, we could either try to increase his levetiracetam further, but I did discuss that it may be best to arrange for him to be admitted to the epilepsy monitoring unit to try to capture and characterize his spells  He was open to this as a possibility of his events would occur again despite the higher dose of levetiracetam, and he will give our office a call if this would occur

## 2022-03-16 NOTE — PATIENT INSTRUCTIONS
-- Please increase your Levetiracetam to be 2 tabs (1000 mg) twice a day  -- if you have any additional episodes, please give us a call  If that would happen, we may either try to increase the Levetiracetam further or we may plan to have you admitted to the Epilepsy Monitoring Unit

## 2022-03-16 NOTE — PROGRESS NOTES
Patient ID: Lorie Mchugh is a 73 y o  male with hypertension, hyperlipidemia, and localization related epilepsy, who is returning to Neurology office for follow up of his seizures  Assessment/Plan:    Ep[facility] ([facility] )  Overall, he has not had any definite seizures since his last appointment, but has been having some odd episodes where he will get a weird taste in his mouth and a feeling of tightness  These have largely been unwitnessed, a does not have any clear loss of consciousness or alteration of consciousness with these episodes  He did have a fall in December which it is unclear if this was related to 1 of these exact types of episodes or not, but since these episodes are still occurring, it may be best for us to increase his dose of levetiracetam to try to make the events stop  --I will have him increase levetiracetam to be 1000 mg twice a day  --if he continues to have episodes, we could either try to increase his levetiracetam further, but I did discuss that it may be best to arrange for him to be admitted to the epilepsy monitoring unit to try to capture and characterize his spells  He was open to this as a possibility of his events would occur again despite the higher dose of levetiracetam, and he will give our office a call if this would occur  He will Return in about 3 months (around 6/16/2022)  Subjective:    HPI  Current seizure medications:  1  Levetiracetam 500 mg in the am and 1000 mg at night  Other medications as per Epic  Since his last visit, he did get a repeat EEG and MRI, both of which were essentially unremarkable  His MRI did show some slight decreased hippocampal volume, but otherwise did not show any other clear source of seizures  He has still had some episodes where he will get a cranberry taste in his mouth and will then feel like he will have "cramping" up of his muscles    These episodes have been going on for a while, but did not have any definite alteration of consciousness  He has a little bit of a poor historian with these episodes  With also questioning his friend who was with him at the visit today, this possible these episodes may be somewhat similar to prior events that he has had in the past     He does note he will get some tingling in his arms at times when lying in bed, but it will be position dependent and can be on his right or left arm  Prior Seizure Medications:  None    His history was also obtained from his friend, who was present at today's visit  Objective:    Blood pressure 120/68, height 6' 1" (1 854 m), weight 106 kg (233 lb)  Physical Exam    Neurological Exam      ROS:    Review of Systems   Constitutional: Negative  Negative for appetite change and fever  HENT: Negative  Negative for hearing loss, tinnitus, trouble swallowing and voice change  Eyes: Negative  Negative for photophobia and pain  Respiratory: Negative  Negative for shortness of breath  Cardiovascular: Negative  Negative for palpitations  Gastrointestinal: Negative  Negative for nausea and vomiting  Endocrine: Negative  Negative for cold intolerance  Genitourinary: Negative  Negative for dysuria, frequency and urgency  Musculoskeletal: Negative  Negative for myalgias and neck pain  Skin: Negative  Negative for rash  Neurological: Positive for seizures (gets a weird taste in mouth then a feeling of tightness occurs) and numbness (tingling in arms )  Negative for dizziness, tremors, syncope, facial asymmetry, speech difficulty, weakness, light-headedness and headaches  Hematological: Negative  Does not bruise/bleed easily  Psychiatric/Behavioral: Negative  Negative for confusion, hallucinations and sleep disturbance  All other systems reviewed and are negative  I personally reviewed the ROS that was entered by the medical assistant    Voice recognition software was used in the generation of this note   There may be unintentional errors including grammatical errors, spelling errors, or pronoun errors

## 2022-04-25 DIAGNOSIS — I10 ESSENTIAL HYPERTENSION: ICD-10-CM

## 2022-04-25 RX ORDER — LISINOPRIL 5 MG/1
5 TABLET ORAL DAILY
Qty: 90 TABLET | Refills: 1 | Status: SHIPPED | OUTPATIENT
Start: 2022-04-25

## 2022-07-30 ENCOUNTER — NURSE TRIAGE (OUTPATIENT)
Dept: OTHER | Facility: OTHER | Age: 67
End: 2022-07-30

## 2022-07-31 ENCOUNTER — TELEPHONE (OUTPATIENT)
Dept: OTHER | Facility: OTHER | Age: 67
End: 2022-07-31

## 2022-07-31 NOTE — TELEPHONE ENCOUNTER
Patient is calling regarding cancelling an appointment      Date/Time: 8/4 @ 11 am     Patient was rescheduled: YES [] NO [x]    Patient requesting call back to reschedule: YES [x] NO []     Patient is covid positive and knows he iwll not be ready to be seen by Thursday, Please call back to reschedule

## 2022-07-31 NOTE — TELEPHONE ENCOUNTER
Regarding: covid positive / slpg/ care advice, medication request   ----- Message from Marychuy Santos MA sent at 7/30/2022  5:30 PM EDT -----  "patient tested positive for Covid  Feverish feeling, had some mucus and overall not feeling well   I wanted to see if Dr Sol Snyder could call me in something for this"

## 2022-08-04 ENCOUNTER — TELEMEDICINE (OUTPATIENT)
Dept: NEUROLOGY | Facility: CLINIC | Age: 67
End: 2022-08-04
Payer: MEDICARE

## 2022-08-04 DIAGNOSIS — G40.909 NONINTRACTABLE EPILEPSY WITHOUT STATUS EPILEPTICUS, UNSPECIFIED EPILEPSY TYPE (HCC): Primary | ICD-10-CM

## 2022-08-04 PROCEDURE — 99214 OFFICE O/P EST MOD 30 MIN: CPT | Performed by: PSYCHIATRY & NEUROLOGY

## 2022-08-04 NOTE — PROGRESS NOTES
Virtual Regular Visit    Verification of patient location:    Patient is located in the following state in which I hold an active license [place]      Assessment/Plan:  Janis Foreman is a 73 y o  male with PMHx of hypertension, hyperlipidemia, and localization related epilepsy  The patient was last seen 3/2022  At the last visit the patient had Keppra increased from 500 mg b i d  to 1000 mg b i d     The patient reports no side effects or issues with compliance  The patient reports 1 episode of chest tightening up which previously had been concerning for seizure activity, he does not report any loss of consciousness during this episode  Plan  - Patient is to continue Keppra 1000 mg b i d , if needed could increase to 1500mg BID if he has more episodes concerning for seizures  -  Patient is to follow up with us in approximately 3 months       Problem List Items Addressed This Visit    None              Reason for visit is   Chief Complaint   Patient presents with    Virtual Regular Visit        Encounter provider Elena Buckley MD    Provider located at [address] [place] [address] [address]  [phone]      Recent Visits  No visits were found meeting these conditions  Showing recent visits within past 7 days and meeting all other requirements  Future Appointments  No visits were found meeting these conditions  Showing future appointments within next 150 days and meeting all other requirements       The patient was identified by name and date of birth  Janis Foreman was informed that this is a telemedicine visit and that the visit is being conducted through [facility] and patient was informed this is a secure, HIPAA-complaint platform  He agrees to proceed     My office door was closed  The patient was notified the following individuals were present in the room both Dr Pierce Burroughs and Dr Aviva Lozano    He acknowledged consent and understanding of privacy and security of the video platform  The patient has agreed to participate and understands they can discontinue the visit at any time  Patient is aware this is a billable service  Ana Novak is a 73 y o  male  With a history of epilepsy presenting today for follow-up  The pt has a pmhx of hypertension, hyperlipidemia, and localization related epilepsy  The patient was last seen 3/2022  Since last visit the patient reports he has had only 1 episode of cramping up  In his chest and neck, he denied any loss of consciousness during this episode  This episode occurred around April or May  The patient at the last visit had an increase in his Keppra dosing from 500 mg b i d  to 1000 mg b i d     The patient reports that he recently was diagnosed with COVID on Saturday, he has experienced some shortness of breath and congestion  The patient was started on Paxlovid saturday by his PCP  Past Medical History:   Diagnosis Date    Cellulitis of leg     LAST ASSESSED: 7/8/14    Contusion, hip     LAST ASSESSED: 7/8/14    Hypertension     not taking medications    Laceration of leg     LAST ASSESSED: 7/8/14    Pelvic hematoma, male     RESOLVED: 5/11/17    Rheumatoid arthritis(714 0)     Seizures (HCC)        Past Surgical History:   Procedure Laterality Date    ARTHROSCOPY KNEE Right     COLONOSCOPY      KNEE SURGERY         Current Outpatient Medications   Medication Sig Dispense Refill    aspirin 325 mg tablet Take 325 mg by mouth daily      levETIRAcetam (KEPPRA) 500 mg tablet Take 1000 mg (2 tabs) twice a day  120 tablet 11    lisinopril (ZESTRIL) 5 mg tablet Take 1 tablet (5 mg total) by mouth daily 90 tablet 1     No current facility-administered medications for this visit  Allergies   Allergen Reactions    Naproxen      Okay with ibuprofen    Pollen Extract        Review of Systems   Constitutional: Negative    Negative for appetite change and fever    HENT: Negative  Negative for hearing loss, tinnitus, trouble swallowing and voice change  Eyes: Negative  Negative for photophobia and pain  Respiratory: Negative  Negative for shortness of breath  Cardiovascular: Negative  Negative for palpitations  Gastrointestinal: Negative  Negative for nausea and vomiting  Endocrine: Negative  Negative for cold intolerance  Genitourinary: Negative  Negative for dysuria, frequency and urgency  Musculoskeletal: Negative  Negative for myalgias and neck pain  Skin: Negative  Negative for rash  Neurological: Negative  Negative for dizziness, tremors, seizures, syncope, facial asymmetry, speech difficulty, weakness, light-headedness, numbness and headaches  Hematological: Negative  Does not bruise/bleed easily  Psychiatric/Behavioral: Negative  Negative for confusion, hallucinations and sleep disturbance  All other systems reviewed and are negative  Video Exam    There were no vitals filed for this visit  Physical Exam  Constitutional:       Appearance: Normal appearance  He is not ill-appearing  HENT:      Head: Normocephalic and atraumatic  Nose: Nose normal       Mouth/Throat:      Mouth: Mucous membranes are moist    Eyes:      Extraocular Movements: Extraocular movements intact  Conjunctiva/sclera: Conjunctivae normal    Pulmonary:      Effort: Pulmonary effort is normal    Musculoskeletal:         General: Normal range of motion  Cervical back: Normal range of motion and neck supple  Neurological:      General: No focal deficit present  Mental Status: He is alert  Cranial Nerves: No cranial nerve deficit     Psychiatric:         Mood and Affect: Mood normal           I spent 31 minutes with patient today in which greater than 50% of the time was spent in counseling/coordination of care regarding Treatment, medication compliance, medication side effects, impression, importance of compliance  VIRTUAL VISIT DISCLAIMER      [employer] verbally agrees to participate in [employer]  Pt is aware that [employer] could be limited without vital signs or the ability to perform a full hands-on physical exam  Adriel Orantes understands he or the provider may request at any time to terminate the video visit and request the patient to seek care or treatment in person

## 2022-10-24 DIAGNOSIS — I10 ESSENTIAL HYPERTENSION: ICD-10-CM

## 2022-10-25 RX ORDER — LISINOPRIL 5 MG/1
5 TABLET ORAL DAILY
Qty: 90 TABLET | Refills: 1 | Status: SHIPPED | OUTPATIENT
Start: 2022-10-25

## 2023-01-11 ENCOUNTER — TELEPHONE (OUTPATIENT)
Dept: FAMILY MEDICINE CLINIC | Facility: CLINIC | Age: 68
End: 2023-01-11

## 2023-01-11 NOTE — TELEPHONE ENCOUNTER
She seen Dr Jazz Hill in the past, he has pain in right groin, and it goes down his leg  He feels it's pressure on his nerve  ? Can he have something for the pain ?  Steroid, muscle relaxer

## 2023-01-11 NOTE — TELEPHONE ENCOUNTER
Needs to be examined and evaluated just because he had Lizette before does not mean he has it again so patient will need visit if we do not have space for the visit he should go to urgent care

## 2023-01-16 ENCOUNTER — APPOINTMENT (OUTPATIENT)
Dept: LAB | Facility: MEDICAL CENTER | Age: 68
End: 2023-01-16

## 2023-01-16 ENCOUNTER — APPOINTMENT (OUTPATIENT)
Dept: RADIOLOGY | Facility: MEDICAL CENTER | Age: 68
End: 2023-01-16

## 2023-01-16 ENCOUNTER — OFFICE VISIT (OUTPATIENT)
Dept: FAMILY MEDICINE CLINIC | Facility: CLINIC | Age: 68
End: 2023-01-16

## 2023-01-16 VITALS
SYSTOLIC BLOOD PRESSURE: 138 MMHG | TEMPERATURE: 96.6 F | HEIGHT: 73 IN | WEIGHT: 220 LBS | HEART RATE: 85 BPM | DIASTOLIC BLOOD PRESSURE: 86 MMHG | OXYGEN SATURATION: 96 % | BODY MASS INDEX: 29.16 KG/M2

## 2023-01-16 DIAGNOSIS — R10.31 CHRONIC PAIN OF RIGHT GROIN: Primary | ICD-10-CM

## 2023-01-16 DIAGNOSIS — M25.552 CHRONIC LEFT HIP PAIN: ICD-10-CM

## 2023-01-16 DIAGNOSIS — R10.9 RIGHT SIDED ABDOMINAL PAIN: ICD-10-CM

## 2023-01-16 DIAGNOSIS — G89.29 CHRONIC PAIN OF RIGHT GROIN: ICD-10-CM

## 2023-01-16 DIAGNOSIS — R10.31 CHRONIC PAIN OF RIGHT GROIN: ICD-10-CM

## 2023-01-16 DIAGNOSIS — G89.29 CHRONIC LEFT HIP PAIN: ICD-10-CM

## 2023-01-16 DIAGNOSIS — M25.559 HIP PAIN: ICD-10-CM

## 2023-01-16 DIAGNOSIS — R56.9 SEIZURE (HCC): ICD-10-CM

## 2023-01-16 DIAGNOSIS — G89.29 CHRONIC PAIN OF RIGHT GROIN: Primary | ICD-10-CM

## 2023-01-16 LAB
ALBUMIN SERPL BCP-MCNC: 3.9 G/DL (ref 3.5–5)
ALP SERPL-CCNC: 82 U/L (ref 46–116)
ALT SERPL W P-5'-P-CCNC: 26 U/L (ref 12–78)
ANION GAP SERPL CALCULATED.3IONS-SCNC: 6 MMOL/L (ref 4–13)
AST SERPL W P-5'-P-CCNC: 25 U/L (ref 5–45)
BILIRUB SERPL-MCNC: 0.44 MG/DL (ref 0.2–1)
BUN SERPL-MCNC: 9 MG/DL (ref 5–25)
CALCIUM SERPL-MCNC: 9.2 MG/DL (ref 8.3–10.1)
CHLORIDE SERPL-SCNC: 108 MMOL/L (ref 96–108)
CO2 SERPL-SCNC: 23 MMOL/L (ref 21–32)
CREAT SERPL-MCNC: 0.81 MG/DL (ref 0.6–1.3)
GFR SERPL CREATININE-BSD FRML MDRD: 92 ML/MIN/1.73SQ M
GLUCOSE P FAST SERPL-MCNC: 96 MG/DL (ref 65–99)
POTASSIUM SERPL-SCNC: 4.4 MMOL/L (ref 3.5–5.3)
PROT SERPL-MCNC: 7.8 G/DL (ref 6.4–8.4)
SODIUM SERPL-SCNC: 137 MMOL/L (ref 135–147)
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
## 2023-01-20 ENCOUNTER — HOSPITAL ENCOUNTER (OUTPATIENT)
Dept: CT IMAGING | Facility: HOSPITAL | Age: 68
Discharge: HOME | End: 2023-01-20
Attending: FAMILY MEDICINE

## 2023-01-20 DIAGNOSIS — G89.29 CHRONIC PAIN OF RIGHT GROIN: ICD-10-CM

## 2023-01-20 DIAGNOSIS — R10.9 RIGHT SIDED ABDOMINAL PAIN: ICD-10-CM

## 2023-01-20 DIAGNOSIS — R10.31 CHRONIC PAIN OF RIGHT GROIN: ICD-10-CM

## 2023-01-20 DIAGNOSIS — M89.9 LESION OF LEFT FEMUR: Primary | ICD-10-CM

## 2023-01-20 RX ADMIN — IOHEXOL 100 ML: 350 INJECTION, SOLUTION INTRAVENOUS at 13:26

## 2023-01-20 NOTE — RESULT ENCOUNTER NOTE
Please call the patient regarding his abnormal result    Hip joints on both sides show mild to moderate narrowing and arthritic changes

## 2023-01-20 NOTE — RESULT ENCOUNTER NOTE
Please call the patient regarding his abnormal result    Patient has mild hip joint narrowing also has some calcification and growth on his femur needs a consult with Dr Marisa Jones to make sure that this does not represent a bone tumor

## 2023-01-26 NOTE — RESULT ENCOUNTER NOTE
Call patient to notify normal results he has a few renal cysts which are benign and do not need any further imaging and he has some diverticulosis of the colon which is normal for someone his age there are no tumors no aneurysms so his CAT scan is basically normal for a 78year-old

## 2023-01-31 ENCOUNTER — OFFICE VISIT (OUTPATIENT)
Dept: OBGYN CLINIC | Facility: CLINIC | Age: 68
End: 2023-01-31

## 2023-01-31 VITALS
BODY MASS INDEX: 28.49 KG/M2 | HEIGHT: 73 IN | WEIGHT: 215 LBS | SYSTOLIC BLOOD PRESSURE: 113 MMHG | DIASTOLIC BLOOD PRESSURE: 75 MMHG | HEART RATE: 75 BPM

## 2023-01-31 DIAGNOSIS — M89.9 LESION OF LEFT FEMUR: ICD-10-CM

## 2023-01-31 DIAGNOSIS — M16.11 PRIMARY OSTEOARTHRITIS OF ONE HIP, RIGHT: Primary | ICD-10-CM

## 2023-01-31 RX ORDER — ACETAMINOPHEN 500 MG
500 TABLET ORAL EVERY 6 HOURS PRN
COMMUNITY

## 2023-01-31 NOTE — PROGRESS NOTES
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  Patient is active  In sports in the region  Groin pain is exacerbated by standing for long periods of time or walking  He has taken mild NSAIDs with some relief  He has done no other conservative management  Denies any trauma to the region    At baseline patient gaits with/without assistance  Denies constitutional symptoms such as fever, chills, night sweats, fatigue, weight gains/losses  Denies chest pain/shortness of breath  Patient denies personal history of cancer          Past Medical History:   Diagnosis Date   • Cellulitis of leg     LAST ASSESSED: 7/8/14   • Contusion, hip     LAST ASSESSED: 7/8/14   • Hypertension     not taking medications   • Laceration of leg     LAST ASSESSED: 7/8/14   • Pelvic hematoma, male     RESOLVED: 5/11/17   • Rheumatoid arthritis(714 0)    • Seizures (HCC)      Past Surgical History:   Procedure Laterality Date   • ARTHROSCOPY KNEE Right    • COLONOSCOPY     • KNEE SURGERY         Current Outpatient Medications:   •  acetaminophen (TYLENOL) 500 mg tablet, Take 500 mg by mouth every 6 (six) hours as needed for mild pain, Disp: , Rfl:   •  aspirin 325 mg tablet, Take 325 mg by mouth daily, Disp: , Rfl:   •  levETIRAcetam (KEPPRA) 500 mg tablet, Take 1000 mg (2 tabs) twice a day , Disp: 120 tablet, Rfl: 11  •  lisinopril (ZESTRIL) 5 mg tablet, Take 1 tablet (5 mg total) by mouth daily, Disp: 90 tablet, Rfl: 1  Allergies   Allergen Reactions   • Naproxen Other (See Comments)     Okay with ibuprofen  (unknown reaction)   • Pollen Extract      Family History   Problem Relation Age of Onset   • Cancer Father    • Heart disease Maternal Grandmother         CARDIAC DISORDER   • Parkinsonism Paternal Grandfather      Social History     Socioeconomic History   • Marital status: /Civil Union     Spouse name: Not on file   • Number of children: Not on file   • Years of education: Not on file   • Highest education level: Not on file   Occupational History   • Not on file   Tobacco Use   • Smoking status: Light Smoker     Types: Cigars   • Smokeless tobacco: Never   Vaping Use   • Vaping Use: Never used   Substance and Sexual Activity   • Alcohol use: Yes     Alcohol/week: 6 0 standard drinks     Types: 6 Cans of beer per week     Comment: OCCASIONAL   • Drug use: No   • Sexual activity: Never   Other Topics Concern   • Not on file   Social History Narrative    ALWAYS USES SEAT BELT    Caffeine use- 1 cup of coffee on occasion     Social Determinants of Health     Financial Resource Strain: Not on file   Food Insecurity: Not on file   Transportation Needs: Not on file   Physical Activity: Not on file   Stress: Not on file   Social Connections: Not on file   Intimate Partner Violence: Not on file   Housing Stability: Not on file         REVIEW OF SYSTEMS  Allergies, medications, past medical/surgical/family/social history have been reviewed  Complete 12 system review performed and found to be negative except: except as per mentioned in HPI  PHYSICAL EXAMINATION  Height: 6' 1" (185 4 cm)  Weight - Scale: 97 5 kg (215 lb)  BMI (Calculated): 28 4  BSA (Calculated - m2): 2 22 sq meters     Vitals:    01/31/23 0924   BP: 113/75   Pulse: 75     Body mass index is 28 37 kg/m²  General: alert and oriented x 3; well nourished/well developed; no apparent distress   Present with self  Psychiatric: normal mood and affect  HEENT: NCAT  Head/neck - full range of motion  Lungs: breathing comfortably; equal symmetric chest expansion  Abdomen: soft, non-tender, non-distended  Skin: warm; dry; no lesions, rashes, petechiae or purpura; no clubbing, no cyanosis, no edema, no palpable masses  Extremities:   Right lower extremity   inspection: no edema, skin abnormalities throughout   Palpation: no palpable masses or lesions   Range of motion of joints: Decreased range of motion with internal rotation approximately 5 degrees, external rotation to 30 degrees   Motor strength: Positive tib ant, EHL, gastrocsoleus complex   Sensation: grossly intact to all extremities  Pulses: 2+ rpedal pulses    [facility]      IMAGING RESULTS, All images personally review today by myself   X-ray AP pelvis right femur: Severe degenerative changes right hip joint, joint space narrowing osteophytic growth  X-ray left femur: Benign looking bony growth medial femoral shaft, possibly old osteochondroma versus trauma same as previous images in 2020      Laboratory:  Lab Results   Component Value Date/Time    WBC 8 98 04/19/2017 06:13 AM    HGB 14 5 04/19/2017 06:13 AM    HCT 42 8 04/19/2017 06:13 AM     04/19/2017 06:13 AM     Lab Results   Component Value Date/Time    K 4 4 01/16/2023 02:16 PM    CO2 23 01/16/2023 02:16 PM    CO2 22 04/18/2017 04:30 PM     01/16/2023 02:16 PM    BUN 9 01/16/2023 02:16 PM    CREATININE 0 81 01/16/2023 02:16 PM    GLUCOSE 119 04/18/2017 04:30 PM    CALCIUM 9 2 01/16/2023 02:16 PM               IMPRESSION/PLAN: Kaitlynn Tabares is a 78 y o  male with right hip osteoarthritis, bilateral hip osteoarthritis    -Patient has not begun any conservative treatment to his right hip  -We will send patient for physical therapy, benefits of physical therapy discussed  -We will send patient for cortisone injection to right hip to help relieve pain  -Discussed arthritis is a chronic incurable irreversible disease, discussed symptom relief with physical therapy, injections and weight loss   -Discussed joint arthroplasty has a pain relieving elective procedure  -We will attempt conservative management at this time,          FOLLOW UP: After full round of physical therapy and injections      40 minutes was spent in the coordination of care, reviewing of imaging and with the patient on the date of service      Grace Shea DO   1/31/2023 2:15 PM

## 2023-02-09 ENCOUNTER — EVALUATION (OUTPATIENT)
Dept: PHYSICAL THERAPY | Facility: HOME HEALTHCARE | Age: 68
End: 2023-02-09

## 2023-02-09 DIAGNOSIS — M16.11 PRIMARY OSTEOARTHRITIS OF ONE HIP, RIGHT: Primary | ICD-10-CM

## 2023-02-09 NOTE — PROGRESS NOTES
PT Evaluation     Today's date: 2023  Patient name: Kevan Del Valle  : 1955  MRN: [id number]  Referring provider: Gregory Mccord DO  Dx:   Encounter Diagnosis     ICD-10-CM    1  Primary osteoarthritis of one hip, right  M16 11           Start Time: 1435  Stop Time: 1515  Total time in clinic (min): 40 minutes    Assessment  Assessment details: Pt is a 79 y/o male presenting to OPPT with chronic R hip/groin pain consistent with diagnosis of R hip OA  Pt is presenting with significant pain levels, ROM deficits, strength deficits, and decreased functional mobility, which is affecting his ambulatory tolerance and ability to perform ADLs  Pt also has R sided knee ext ROM deficits and some R sided low back pain/tenderness, which could be related to his hip symptoms  Pt requires skilled PT in order to improve in pain, strength, ROM, functional mobility, quality of life, and return to PLOF  Thank you! Impairments: abnormal gait, abnormal or restricted ROM, abnormal movement, activity intolerance, impaired physical strength, lacks appropriate home exercise program, pain with function, weight-bearing intolerance and poor body mechanics  Understanding of Dx/Px/POC: good   Prognosis: good    Goals  STG: 3-4 weeks  Pt will be independent with HEP in order to continue to progress outside of PT treatment sessions  Pt will improve in quality of life as demonstrated by worst pain levels of 5/10 or less  Pt will improve in functional mobility as demonstrated by R hip flex ROM of 100 deg or greater  LT-8 weeks  Pt will improve in quality of life as demonstrated by worst pain levels of 2/10 or less  Pt will improve in functional mobility as demonstrated by strength levels of 4+/5 or greater  Pt will improve in functional mobility as demonstrated by R hip ROM WFL  Pt will improve in functional mobility as demonstrated by R knee ROM WFL    Pt will improve in functional mobility as demonstrated by ability to perform full ADLs without any limitations due to pain  Plan  Patient would benefit from: skilled physical therapy  Planned modality interventions: cryotherapy  Planned therapy interventions: balance/weight bearing training, body mechanics training, flexibility, functional ROM exercises, gait training, home exercise program, joint mobilization, massage, manual therapy, neuromuscular re-education, patient education, postural training, strengthening, stretching, therapeutic activities and therapeutic exercise  Frequency: 2x week  Duration in weeks: 4  Plan of Care beginning date: 2023  Plan of Care expiration date: 3/9/2023  Treatment plan discussed with: patient        Subjective Evaluation    History of Present Illness  Mechanism of injury: Pt is presenting to OPPT with chronic R hip/groin pain which has been ongoing for over 6 months, however, it has worsened significantly over the past 3 weeks  Pt reports that he does have a PMH of R knee problems and low back pain with HNP  He reports that he still thinks he is getting some pain from his back and that certain positions, the weather, etc, affects his pain  For his hip, the pt reports that he is scheduled for an injection on 2023  His MD wants him to try conservative management first for his hip, and then will consider surgery if that doesn't help    Pain  Current pain ratin  At best pain ratin  At worst pain ratin  Location: R anterior hip/groin, lateral hip  Quality: dull ache and sharp  Relieving factors: rest  Aggravating factors: standing, walking, stair climbing and lifting    Social Support  Steps to enter house: yes  Stairs in house: yes       Diagnostic Tests  X-ray: abnormal  Patient Goals  Patient goals for therapy: decreased pain, increased motion, increased strength, independence with ADLs/IADLs and return to sport/leisure activities  Patient goal: To get rid of the pain, 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        Objective     Palpation     Right   Hypertonic in the erector spinae and lumbar paraspinals  Tenderness of the erector spinae and lumbar paraspinals  Trigger point to lumbar paraspinals  Neurological Testing     Sensation     Hip   Left Hip   Intact: light touch    Right Hip   Intact: light touch    Active Range of Motion     Lumbar   Flexion:  with pain Restriction level: moderate  Extension:  with pain Restriction level: maximal  Left lateral flexion:  with pain Restriction level: minimal  Right lateral flexion:  with pain Restriction level: minimal  Left rotation:  with pain Restriction level: minimal  Right rotation:  with pain Restriction level: minimal    Passive Range of Motion   Left Hip   Flexion: 104 degrees   Abduction: 30 degrees   External rotation (90/90): 45 degrees   Internal rotation (90/90): 10 degrees     Right Hip   Flexion: 95 degrees with pain  Abduction: 20 degrees with pain  External rotation (90/90): 35 degrees with pain  Internal rotation (90/90): 20 degrees with pain  Left Knee   Flexion: WFL  Extension: WFL    Right Knee   Flexion: WFL  Extension: 10 degrees     Strength/Myotome Testing     Left Hip   Planes of Motion   Flexion: 4+  Abduction: 4+  Adduction: 4+    Right Hip   Planes of Motion   Flexion: 3+  Abduction: 3+  Adduction: 4+    Left Knee   Flexion: 5  Extension: 5    Right Knee   Flexion: 4  Extension: 4    Left Ankle/Foot   Dorsiflexion: 5  Plantar flexion: 5    Right Ankle/Foot   Dorsiflexion: 5  Plantar flexion: 5    Tests     Right Hip   Positive SIMÓN and SABINAIR  Modified Kirby: Positive       Ambulation     Ambulation: Level Surfaces   Ambulation without assistive device: independent    Ambulation: Stairs   Ascend stairs: independent  Pattern: non-reciprocal  Railings: one rail  Descend stairs: independent  Pattern: non-reciprocal  Railings: one rail  Curbs: independent    Observational Gait   Gait: antalgic   Decreased walking speed, stride length and right stance time                Precautions: None    Re-eval Date: 03/09/2023      Manuals 2/9            R hip PROM 8'            Colt hs, glute stretch             LAD mobilization JA                         Neuro Re-Ed             Balance as appropriate                                                    Ther Ex             Nustep             SKTC HEP            Supine hip flexor stretch HEP            SLR             Bridges HEP            Clamshells HEP            S/L hip abd             Prone hip ext             Standing hip flex/ext/abd             Squat             Leg press             LF hip abd             LF hip add                          Ther Activity                                       Gait Training                                       Modalities             CP

## 2023-02-14 ENCOUNTER — OFFICE VISIT (OUTPATIENT)
Dept: PHYSICAL THERAPY | Facility: HOME HEALTHCARE | Age: 68
End: 2023-02-14

## 2023-02-14 DIAGNOSIS — M16.11 PRIMARY OSTEOARTHRITIS OF ONE HIP, RIGHT: Primary | ICD-10-CM

## 2023-02-14 NOTE — PROGRESS NOTES
Daily Note     Today's date: 2023  Patient name: Lilia Hawley  : 1955  MRN: [id number]  Referring provider: Joyce Gonzalez DO  Dx:   Encounter Diagnosis     ICD-10-CM    1  Primary osteoarthritis of one hip, right  M16 11                  Subjective: Pt reports his R hip and groin are sore  Objective: See treatment diary below      Assessment: Tolerated treatment fairly well  Verbal cues needed t/o exercises to perform correctly  Pt reports some pain R hip and groin area with exercises  Patient would benefit from continued PT to decrease pain and improve ROM, strength and overall functional mobility  Plan: Continue per plan of care        Precautions: None    Re-eval Date: 2023      Manuals            R hip PROM 8'            Colt hs, glute stretch  10'           LAD mobilization JA                         Neuro Re-Ed             Balance as appropriate                                                    Ther Ex             Nustep  L1 10'           SKTC HEP 5"x5           Supine hip flexor stretch HEP            SLR  1x10 Colt           Bridges HEP 1x10            Clamshells HEP 1x10 R           S/L hip abd             Prone hip ext             Standing hip flex/ext/abd  1x10 ea Colt            Squat  1x10            Leg press             LF hip abd             LF hip add                          Ther Activity                                       Gait Training                                       Modalities             CP  Pt declined

## 2023-02-16 ENCOUNTER — HOSPITAL ENCOUNTER (OUTPATIENT)
Facility: HOSPITAL | Age: 68
Setting detail: OUTPATIENT SURGERY
Discharge: HOME | End: 2023-02-16
Attending: ANESTHESIOLOGY | Admitting: ANESTHESIOLOGY

## 2023-02-16 ENCOUNTER — APPOINTMENT (OUTPATIENT)
Dept: RADIOLOGY | Facility: HOSPITAL | Age: 68
End: 2023-02-16

## 2023-02-16 VITALS
WEIGHT: 215 LBS | SYSTOLIC BLOOD PRESSURE: 148 MMHG | DIASTOLIC BLOOD PRESSURE: 74 MMHG | HEART RATE: 70 BPM | TEMPERATURE: 96.9 F | OXYGEN SATURATION: 97 % | RESPIRATION RATE: 21 BRPM | BODY MASS INDEX: 28.49 KG/M2 | HEIGHT: 73 IN

## 2023-02-16 DIAGNOSIS — M16.11 PRIMARY OSTEOARTHRITIS OF ONE HIP, RIGHT: ICD-10-CM

## 2023-02-16 RX ORDER — BUPIVACAINE HYDROCHLORIDE 2.5 MG/ML
INJECTION, SOLUTION EPIDURAL; INFILTRATION; INTRACAUDAL AS NEEDED
Status: DISCONTINUED | OUTPATIENT
Start: 2023-02-16 | End: 2023-02-16 | Stop reason: HOSPADM

## 2023-02-16 RX ORDER — LIDOCAINE HYDROCHLORIDE 10 MG/ML
INJECTION, SOLUTION EPIDURAL; INFILTRATION; INTRACAUDAL; PERINEURAL AS NEEDED
Status: DISCONTINUED | OUTPATIENT
Start: 2023-02-16 | End: 2023-02-16 | Stop reason: HOSPADM

## 2023-02-16 RX ORDER — METHYLPREDNISOLONE ACETATE 40 MG/ML
INJECTION, SUSPENSION INTRA-ARTICULAR; INTRALESIONAL; INTRAMUSCULAR; SOFT TISSUE AS NEEDED
Status: DISCONTINUED | OUTPATIENT
Start: 2023-02-16 | End: 2023-02-16 | Stop reason: HOSPADM

## 2023-02-16 NOTE — OP NOTE
OPERATIVE REPORT  PATIENT NAME: Tisha Gandhi    :  1955  MRN: [id number]  Pt Location: [facility] OR ROOM 01    SURGERY DATE: 2023    Surgeon(s) and Role:     * Amirah Castillo MD - Primary    Preop Diagnosis:  Primary osteoarthritis of one hip, right [M16 11]    Post-Op Diagnosis Codes:     * Primary osteoarthritis of one hip, right [M16 11]    Procedure(s):  Right - INJECTION JOINT HIP    Specimen(s):  * No specimens in log *    Estimated Blood Loss:   Minimal    Drains:  * No LDAs found *    Anesthesia Type:   Local    Operative Indications:  Primary osteoarthritis of one hip, right [M16 11]      Operative Findings:  same    Complications:   None    Procedure and Technique:  Fluoroscopically-guided right intraarticular hip injection      After discussing the risks, benefits, and alternatives to the procedure, the patient expressed understanding and wished to proceed  The patient was brought to the fluoroscopic suite and placed in the supine position  Procedural pause conducted to verify:  correct patient identity, procedure to be performed, and as applicable, correct side and site, correct patient position, and availability of implants, special equipment and special requirements  The femoral artery was palpated and marked  Using fluoroscopy, an AP view was utilized to visualize the right hip joint  Next, a point at the junction of the ipsilateral femoral head and femoral neck was identified, and noted to be a safe distance lateral to the pulsation of the aforementioned femoral artery  The skin was sterilely prepped and draped in the usual fashion using Chloraprep skin prep  The skin and subcutaneous tissues were anesthetized with 0 5% lidocaine  Using fluoroscopic guidance, a 3 5 inch 22 gauge spinal needle was advanced into the joint  After negative aspiration, 2 mL of Omnipaque 300 contrast was injected which confirmed intra-articular placement without evidence of intravascular uptake   A 4 ml solution consisting of 40 mg of Depo-Medrol in 0 25% bupivacaine was injected  The needle was withdrawn from the skin while being flushed with lidocaine  The patient tolerated the procedure well, and there were no apparent complications  After appropriate observation, the patient was dismissed from the outpatient procedure area in good condition under their own power                    I was present for the entire procedure    Patient Disposition:  hemodynamically stable        SIGNATURE: Leilani Booth MD  DATE: February 16, 2023  TIME: 10:12 AM

## 2023-02-16 NOTE — DISCHARGE INSTR - AVS FIRST PAGE
Steroid Joint Injection   WHAT YOU NEED TO KNOW:   A steroid joint injection is a procedure to inject steroid medicine into a joint  Steroid medicine decreases pain and inflammation  The injection may also contain an anesthetic (numbing medicine) to decrease pain  It may be done to treat conditions such as arthritis, gout, or carpal tunnel syndrome  The injections may be given in your knee, ankle, shoulder, elbow, wrist, ankle or sacroiliac joint  Do not apply heat to any area that is numb  If you have discomfort or soreness at the injection site, you may apply ice today, 20 minutes on and 20 minutes off  Tomorrow you may use ice or warm, moist heat  Do not apply ice or heat directly to the skin  You may have an increase or change in the discomfort for 36-48 hours after your treatment  Apply ice and continue with any pain medicine you have been prescribed  Do not do anything strenuous today  You may shower, but no tub baths or hot tubs today  You may resume your normal activities tomorrow, but do not “overdo it”  Resume normal activities slowly when you are feeling better  If you experience redness, drainage or swelling at the injection site, or if you develop a fever above 100 degrees, please call [facility] at [phone] or go to the Emergency Room  Continue to take all routine medicines prescribed by your primary care physician unless otherwise instructed by our staff  Most blood thinners should be started again according to your regularly scheduled dosing  If you have any questions, please give our office a call  As no general anesthesia was used in today's procedure, you should not experience any side effects related to anesthesia  If you are diabetic, the steroids used in today's injection may temporarily increase your blood sugar levels after the first few days after your injection   Please keep a close eye on your sugars and alert the doctor who manages your diabetes if your sugars are significantly high from your baseline or you are symptomatic  If you have a problem specifically related to your procedure, please call our office at [phone]  Problems not related to your procedure should be directed to your primary care physician
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
## 2023-02-21 ENCOUNTER — OFFICE VISIT (OUTPATIENT)
Dept: PHYSICAL THERAPY | Facility: HOME HEALTHCARE | Age: 68
End: 2023-02-21

## 2023-02-21 DIAGNOSIS — M16.11 PRIMARY OSTEOARTHRITIS OF ONE HIP, RIGHT: Primary | ICD-10-CM

## 2023-02-21 NOTE — PROGRESS NOTES
Daily Note     Today's date: 2023  Patient name: Carlos Douglas  : 1955  MRN: [id number]  Referring provider: Nan Castañeda DO  Dx:   Encounter Diagnosis     ICD-10-CM    1  Primary osteoarthritis of one hip, right  M16 11           Start Time: 1425          Subjective: I got the injection on the 16 and it really helped a lot  I have just a little bit of pain in my hip and groin area  Nothing to complain about  I need to shorten my PT visit today 2* an appt with my Mom  Objective: See treatment diary below    Assessment: Pt with time limitation today 2* conflicting appt for his Mother  Pt reginadlo ex session well and without c/o increased pain at R LB,hip or groin regions  Pt required vc's for correct form  Pt with B LE calf, HS and R groin ROM limitations noted  Pt did report occasional R foot n/t t/o session  Pt reports feeling well at end, declined CP and should be able to progress NV  Patient would benefit from continued PT    Plan: Continue per plan of care        Precautions: None    Re-eval Date: 2023      Manuals  2-21          R hip PROM 8'            Maria Elena hs, glute stretch  R adductor  10' 8''    2'          LAD mobilization JA                         Neuro Re-Ed   -          Balance as appropriate                                                    Ther Ex   2-21          Nustep  L1 10' L2  10'          SKTC HEP 5"x5 5" x5          Supine hip flexor stretch HEP            SLR  1x10 Maria Elena 1x10 maria elena          Bridges HEP 1x10  1x10          Clamshells HEP 1x10 R 1x10 R          S/L hip abd             Hip abd   Red 1x10          Hip add   5" x10          Prone hip ext             Standing hip flex/ext/abd  1x10 ea Maria Elena  1x10 ea maria elena          Squat  1x10  1x10          Leg press             LF hip abd             LF hip add                          Ther Activity                                       Gait Training                                       Modalities   2-21          CP Pt declined  Pt declined

## 2023-02-23 ENCOUNTER — TELEPHONE (OUTPATIENT)
Dept: RADIOLOGY | Facility: MEDICAL CENTER | Age: 68
End: 2023-02-23

## 2023-02-23 ENCOUNTER — APPOINTMENT (OUTPATIENT)
Dept: PHYSICAL THERAPY | Facility: HOME HEALTHCARE | Age: 68
End: 2023-02-23

## 2023-02-28 ENCOUNTER — APPOINTMENT (OUTPATIENT)
Dept: PHYSICAL THERAPY | Facility: HOME HEALTHCARE | Age: 68
End: 2023-02-28

## 2023-03-02 ENCOUNTER — OFFICE VISIT (OUTPATIENT)
Dept: PHYSICAL THERAPY | Facility: HOME HEALTHCARE | Age: 68
End: 2023-03-02

## 2023-03-02 DIAGNOSIS — M16.11 PRIMARY OSTEOARTHRITIS OF ONE HIP, RIGHT: Primary | ICD-10-CM

## 2023-03-02 NOTE — PROGRESS NOTES
PT Discharge    Today's date: 3/2/2023  Patient name: Sharon Menjivar  : 1955  MRN: [id number]  Referring provider: Shen Gonzalez DO  Dx:   Encounter Diagnosis     ICD-10-CM    1  Primary osteoarthritis of one hip, right  M16 11           Start Time: 1435  Stop Time: 1445  Total time in clinic (min): 10 minutes    Assessment  Assessment details: Pt has made significant improvements in pain, strength, ROM, and overall mobility since beginning PT  The pt has demonstrated independence with his HEP and he is confident in his ability to adhere to his HEP and further progress on his own  Pt will be D/C to HEP at this time  Understanding of Dx/Px/POC: good   Prognosis: good    Goals  STG: 3-4 weeks  Pt will be independent with HEP in order to continue to progress outside of PT treatment sessions  Pt will improve in quality of life as demonstrated by worst pain levels of 5/10 or less  Pt will improve in functional mobility as demonstrated by R hip flex ROM of 100 deg or greater  LT-8 weeks  Pt will improve in quality of life as demonstrated by worst pain levels of 2/10 or less  Pt will improve in functional mobility as demonstrated by strength levels of 4+/5 or greater  Pt will improve in functional mobility as demonstrated by R hip ROM WFL  Pt will improve in functional mobility as demonstrated by R knee ROM WFL  Pt will improve in functional mobility as demonstrated by ability to perform full ADLs without any limitations due to pain  Plan  Plan details: D/C to HEP  Treatment plan discussed with: patient        Subjective Evaluation    History of Present Illness  Mechanism of injury: Pt is presenting to [facility] with chronic R hip/groin pain which has been ongoing for over 6 months, however, it has worsened significantly over the past 3 weeks  Pt reports that he does have a PMH of R knee problems and low back pain with HNP    He reports that he still thinks he is getting some pain from his back and that certain positions, the weather, etc, affects his pain  For his hip, the pt reports that he is scheduled for an injection on 2023  His MD wants him to try conservative management first for his hip, and then will consider surgery if that doesn't help  UPDATE 2023:  Pt reports that his hip has been feeling good and that he has no pain  Pt reports good compliance with his HEP and that he feels he is ready for D/C from PT at this time  Pain  Current pain ratin  At best pain ratin  At worst pain ratin  Location: R anterior hip/groin, lateral hip  Quality: dull ache and sharp  Relieving factors: rest  Aggravating factors: standing, walking, stair climbing and lifting    Social Support  Steps to enter house: yes  Stairs in house: yes       Diagnostic Tests  X-ray: abnormal  Patient Goals  Patient goals for therapy: decreased pain, increased motion, increased strength, independence with ADLs/IADLs and return to sport/leisure activities  Patient goal: To get rid of the pain, 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        Objective     Palpation     Right   Hypertonic in the erector spinae and lumbar paraspinals  Tenderness of the erector spinae and lumbar paraspinals  Trigger point to lumbar paraspinals       Neurological Testing     Sensation     Hip   Left Hip   Intact: light touch    Right Hip   Intact: light touch    Active Range of Motion     Lumbar   Flexion:  with pain Restriction level: moderate  Extension:  with pain Restriction level: maximal  Left lateral flexion:  with pain Restriction level: minimal  Right lateral flexion:  with pain Restriction level: minimal  Left rotation:  with pain Restriction level: minimal  Right rotation:  with pain Restriction level: minimal    Passive Range of Motion   Left Hip   Flexion: 104 degrees   Abduction: 30 degrees   External rotation (90/90): 45 degrees   Internal rotation (90/90): 10 degrees     Right Hip   Flexion: 95 degrees with pain  Abduction: 20 degrees with pain  External rotation (90/90): 35 degrees with pain  Internal rotation (90/90): 20 degrees with pain  Left Knee   Flexion: WFL  Extension: WFL    Right Knee   Flexion: WFL  Extension: 10 degrees     Strength/Myotome Testing     Left Hip   Planes of Motion   Flexion: 4+  Abduction: 4+  Adduction: 4+    Right Hip   Planes of Motion   Flexion: 3+  Abduction: 3+  Adduction: 4+    Left Knee   Flexion: 5  Extension: 5    Right Knee   Flexion: 4  Extension: 4    Left Ankle/Foot   Dorsiflexion: 5  Plantar flexion: 5    Right Ankle/Foot   Dorsiflexion: 5  Plantar flexion: 5    Tests     Right Hip   Positive SIMÓN and SABINAIR  Modified Kirby: Positive  Ambulation     Ambulation: Level Surfaces   Ambulation without assistive device: independent    Ambulation: Stairs   Ascend stairs: independent  Pattern: non-reciprocal  Railings: one rail  Descend stairs: independent  Pattern: non-reciprocal  Railings: one rail  Curbs: independent    Observational Gait   Gait: antalgic   Decreased walking speed, stride length and right stance time                Precautions: None    Re-eval Date: 03/09/2023      Manuals             R hip PROM             Colt hs, glute stretch             LAD mobilization                          Neuro Re-Ed             Balance as appropriate                                                    Ther Ex             Nustep             SKTC             Supine hip flexor stretch             SLR             Bridges             Clamshells             S/L hip abd             Prone hip ext             Standing hip flex/ext/abd             Squat             Leg press             LF hip abd             LF hip add                          Ther Activity                                       Gait Training                                       Modalities             CP

## 2023-03-23 DIAGNOSIS — R56.9 SEIZURE (HCC): ICD-10-CM

## 2023-03-23 RX ORDER — LEVETIRACETAM 500 MG/1
TABLET ORAL
Qty: 120 TABLET | Refills: 11 | Status: SHIPPED | OUTPATIENT
Start: 2023-03-23

## 2023-04-01 DIAGNOSIS — R56.9 SEIZURE (HCC): ICD-10-CM

## 2023-04-03 RX ORDER — LEVETIRACETAM 500 MG/1
TABLET ORAL
Qty: 120 TABLET | Refills: 11 | Status: SHIPPED | OUTPATIENT
Start: 2023-04-03

## 2023-04-04 ENCOUNTER — APPOINTMENT (EMERGENCY)
Dept: CT IMAGING | Facility: HOSPITAL | Age: 68
End: 2023-04-04

## 2023-04-04 ENCOUNTER — HOSPITAL ENCOUNTER (EMERGENCY)
Facility: HOSPITAL | Age: 68
Discharge: HOME | End: 2023-04-04
Attending: EMERGENCY MEDICINE

## 2023-04-04 ENCOUNTER — APPOINTMENT (EMERGENCY)
Dept: RADIOLOGY | Facility: HOSPITAL | Age: 68
End: 2023-04-04

## 2023-04-04 VITALS
HEART RATE: 84 BPM | OXYGEN SATURATION: 97 % | WEIGHT: 224.21 LBS | SYSTOLIC BLOOD PRESSURE: 158 MMHG | HEIGHT: 73 IN | DIASTOLIC BLOOD PRESSURE: 95 MMHG | RESPIRATION RATE: 16 BRPM | BODY MASS INDEX: 29.72 KG/M2 | TEMPERATURE: 98 F

## 2023-04-04 DIAGNOSIS — G40.919 BREAKTHROUGH SEIZURE (HCC): Primary | ICD-10-CM

## 2023-04-04 LAB
2HR DELTA HS TROPONIN: -1 NG/L
ALBUMIN SERPL BCP-MCNC: 4.2 G/DL (ref 3.5–5)
ALP SERPL-CCNC: 68 U/L (ref 34–104)
ALT SERPL W P-5'-P-CCNC: 15 U/L (ref 7–52)
AMPHETAMINES SERPL QL SCN: NEGATIVE
ANION GAP SERPL CALCULATED.3IONS-SCNC: 13 MMOL/L (ref 4–13)
APTT PPP: 31 SECONDS (ref 23–37)
AST SERPL W P-5'-P-CCNC: 23 U/L (ref 13–39)
BACTERIA UR QL AUTO: NORMAL /HPF
BARBITURATES UR QL: NEGATIVE
BASOPHILS # BLD AUTO: 0.07 THOUSANDS/ÂΜL (ref 0–0.1)
BASOPHILS NFR BLD AUTO: 1 % (ref 0–1)
BENZODIAZ UR QL: NEGATIVE
BILIRUB SERPL-MCNC: 0.81 MG/DL (ref 0.2–1)
BILIRUB UR QL STRIP: ABNORMAL
BNP SERPL-MCNC: 27 PG/ML (ref 0–100)
BUN SERPL-MCNC: 10 MG/DL (ref 5–25)
CALCIUM SERPL-MCNC: 9.1 MG/DL (ref 8.4–10.2)
CARDIAC TROPONIN I PNL SERPL HS: 5 NG/L
CARDIAC TROPONIN I PNL SERPL HS: 6 NG/L
CHLORIDE SERPL-SCNC: 102 MMOL/L (ref 96–108)
CLARITY UR: CLEAR
CO2 SERPL-SCNC: 19 MMOL/L (ref 21–32)
COCAINE UR QL: NEGATIVE
COLOR UR: YELLOW
CREAT SERPL-MCNC: 0.87 MG/DL (ref 0.6–1.3)
EOSINOPHIL # BLD AUTO: 0.02 THOUSAND/ÂΜL (ref 0–0.61)
EOSINOPHIL NFR BLD AUTO: 0 % (ref 0–6)
ERYTHROCYTE [DISTWIDTH] IN BLOOD BY AUTOMATED COUNT: 12.2 % (ref 11.6–15.1)
ETHANOL SERPL-MCNC: <10 MG/DL
FLUAV RNA RESP QL NAA+PROBE: NEGATIVE
FLUBV RNA RESP QL NAA+PROBE: NEGATIVE
GFR SERPL CREATININE-BSD FRML MDRD: 89 ML/MIN/1.73SQ M
GLUCOSE SERPL-MCNC: 110 MG/DL (ref 65–140)
GLUCOSE UR STRIP-MCNC: NEGATIVE MG/DL
HCT VFR BLD AUTO: 44.8 % (ref 36.5–49.3)
HGB BLD-MCNC: 15.5 G/DL (ref 12–17)
HGB UR QL STRIP.AUTO: ABNORMAL
IMM GRANULOCYTES # BLD AUTO: 0.02 THOUSAND/UL (ref 0–0.2)
IMM GRANULOCYTES NFR BLD AUTO: 0 % (ref 0–2)
INR PPP: 0.96 (ref 0.84–1.19)
KETONES UR STRIP-MCNC: ABNORMAL MG/DL
LACTATE SERPL-SCNC: 3 MMOL/L (ref 0.5–2)
LEUKOCYTE ESTERASE UR QL STRIP: NEGATIVE
LYMPHOCYTES # BLD AUTO: 1.42 THOUSANDS/ÂΜL (ref 0.6–4.47)
LYMPHOCYTES NFR BLD AUTO: 16 % (ref 14–44)
MAGNESIUM SERPL-MCNC: 2.3 MG/DL (ref 1.9–2.7)
MCH RBC QN AUTO: 32.6 PG (ref 26.8–34.3)
MCHC RBC AUTO-ENTMCNC: 34.6 G/DL (ref 31.4–37.4)
MCV RBC AUTO: 94 FL (ref 82–98)
METHADONE UR QL: NEGATIVE
MONOCYTES # BLD AUTO: 0.74 THOUSAND/ÂΜL (ref 0.17–1.22)
MONOCYTES NFR BLD AUTO: 8 % (ref 4–12)
NEUTROPHILS # BLD AUTO: 6.84 THOUSANDS/ÂΜL (ref 1.85–7.62)
NEUTS SEG NFR BLD AUTO: 75 % (ref 43–75)
NITRITE UR QL STRIP: NEGATIVE
NON-SQ EPI CELLS URNS QL MICRO: NORMAL /HPF
NRBC BLD AUTO-RTO: 0 /100 WBCS
OPIATES UR QL SCN: NEGATIVE
OXYCODONE+OXYMORPHONE UR QL SCN: NEGATIVE
PCP UR QL: NEGATIVE
PH UR STRIP.AUTO: 5.5 [PH]
PLATELET # BLD AUTO: 322 THOUSANDS/UL (ref 149–390)
PMV BLD AUTO: 8.2 FL (ref 8.9–12.7)
POTASSIUM SERPL-SCNC: 4.2 MMOL/L (ref 3.5–5.3)
PROT SERPL-MCNC: 7.1 G/DL (ref 6.4–8.4)
PROT UR STRIP-MCNC: ABNORMAL MG/DL
PROTHROMBIN TIME: 12.9 SECONDS (ref 11.6–14.5)
RBC # BLD AUTO: 4.76 MILLION/UL (ref 3.88–5.62)
RBC #/AREA URNS AUTO: NORMAL /HPF
RSV RNA RESP QL NAA+PROBE: NEGATIVE
SARS-COV-2 RNA RESP QL NAA+PROBE: NEGATIVE
SODIUM SERPL-SCNC: 134 MMOL/L (ref 135–147)
SP GR UR STRIP.AUTO: >=1.03 (ref 1–1.03)
THC UR QL: NEGATIVE
UROBILINOGEN UR QL STRIP.AUTO: 0.2 E.U./DL
WBC # BLD AUTO: 9.11 THOUSAND/UL (ref 4.31–10.16)
WBC #/AREA URNS AUTO: NORMAL /HPF

## 2023-04-04 RX ADMIN — LEVETIRACETAM 1000 MG: 100 INJECTION, SOLUTION, CONCENTRATE INTRAVENOUS at 18:28

## 2023-04-04 RX ADMIN — SODIUM CHLORIDE 1000 ML: 0.9 INJECTION, SOLUTION INTRAVENOUS at 18:20
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
## 2023-04-05 ENCOUNTER — TELEPHONE (OUTPATIENT)
Dept: NEUROLOGY | Facility: CLINIC | Age: 68
End: 2023-04-05

## 2023-04-05 LAB
ATRIAL RATE: 95 BPM
P AXIS: 49 DEGREES
PR INTERVAL: 162 MS
QRS AXIS: 46 DEGREES
QRSD INTERVAL: 82 MS
QT INTERVAL: 354 MS
QTC INTERVAL: 444 MS
T WAVE AXIS: 55 DEGREES
VENTRICULAR RATE: 95 BPM

## 2023-04-05 NOTE — TELEPHONE ENCOUNTER
Patient called and stated that he had been seen in the ED and needed to be seen sooner than his 6/9/23 appointment  Can you please provide a sooner appointment?  Thank you

## 2023-04-05 NOTE — DISCHARGE INSTRUCTIONS
Thank you for visiting the Emergency Department today  You were evaluated for breakthrough seizure  There was no acute findings today suggest new cause  CT scan was normal   After discussing with your neurologist he feels that this breakthrough seizure was more likely related to missed dose of Keppra this morning as well as reduced dose last evening  For that reason he recommends resuming the medication as you are currently taking it (1000 mg in the morning, 1000 mg in the evening) and following up with neurology in the office  He does not feel that admission to the EMU or increased dose is necessary at this time  Return for recurrent episodes or other concerns  Take the evening dose of your Keppra tonight  Call neurology office for follow-up appointment as soon as possible  Inquire as to whether they want to move up your previously scheduled appointment for June  We are recommending no driving at this time until cleared by PCP or neurology

## 2023-04-07 LAB — LEVETIRACETAM SERPL-MCNC: 5.6 UG/ML (ref 10–40)

## 2023-04-24 DIAGNOSIS — I10 ESSENTIAL HYPERTENSION: ICD-10-CM

## 2023-04-24 RX ORDER — LISINOPRIL 5 MG/1
5 TABLET ORAL DAILY
Qty: 90 TABLET | Refills: 1 | Status: SHIPPED | OUTPATIENT
Start: 2023-04-24

## 2023-05-03 ENCOUNTER — OFFICE VISIT (OUTPATIENT)
Dept: FAMILY MEDICINE CLINIC | Facility: CLINIC | Age: 68
End: 2023-05-03

## 2023-05-03 VITALS
TEMPERATURE: 97 F | BODY MASS INDEX: 29.42 KG/M2 | WEIGHT: 222 LBS | DIASTOLIC BLOOD PRESSURE: 80 MMHG | SYSTOLIC BLOOD PRESSURE: 152 MMHG | HEIGHT: 73 IN

## 2023-05-03 DIAGNOSIS — M54.16 CHRONIC RIGHT-SIDED LUMBAR RADICULOPATHY: Primary | ICD-10-CM

## 2023-05-03 DIAGNOSIS — G89.29 CHRONIC RIGHT HIP PAIN: ICD-10-CM

## 2023-05-03 DIAGNOSIS — M25.551 CHRONIC RIGHT HIP PAIN: ICD-10-CM
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
## 2023-05-11 ENCOUNTER — HOSPITAL ENCOUNTER (OUTPATIENT)
Dept: MRI IMAGING | Facility: HOSPITAL | Age: 68
Discharge: HOME | End: 2023-05-11
Attending: FAMILY MEDICINE

## 2023-05-11 DIAGNOSIS — M25.551 CHRONIC RIGHT HIP PAIN: ICD-10-CM

## 2023-05-11 DIAGNOSIS — M54.16 CHRONIC RIGHT-SIDED LUMBAR RADICULOPATHY: ICD-10-CM

## 2023-05-11 DIAGNOSIS — G89.29 CHRONIC RIGHT HIP PAIN: ICD-10-CM

## 2023-05-15 ENCOUNTER — APPOINTMENT (OUTPATIENT)
Dept: RADIOLOGY | Facility: MEDICAL CENTER | Age: 68
End: 2023-05-15

## 2023-05-15 DIAGNOSIS — M54.16 CHRONIC RIGHT-SIDED LUMBAR RADICULOPATHY: ICD-10-CM

## 2023-05-16 NOTE — RESULT ENCOUNTER NOTE
Call patient to notify normal results patient has some bulging disks but none of the nerve roots are being pressed upon and there is no pressure on the spinal cord itself so I do not think his back is the reason that he has the pain and I would start looking at the hip itself

## 2023-05-18 NOTE — RESULT ENCOUNTER NOTE
Please call the patient regarding his abnormal result    A few areas of disc space narrowing on the x-ray of the back but nothing that is severe in nature I still think that his hip pain is more from his hip and not as much from his back

## 2023-05-19 ENCOUNTER — OFFICE VISIT (OUTPATIENT)
Dept: FAMILY MEDICINE CLINIC | Facility: CLINIC | Age: 68
End: 2023-05-19

## 2023-05-19 VITALS
DIASTOLIC BLOOD PRESSURE: 68 MMHG | OXYGEN SATURATION: 98 % | HEIGHT: 73 IN | HEART RATE: 86 BPM | BODY MASS INDEX: 29.34 KG/M2 | SYSTOLIC BLOOD PRESSURE: 124 MMHG | WEIGHT: 221.4 LBS

## 2023-05-19 DIAGNOSIS — S73.191A TEAR OF RIGHT ACETABULAR LABRUM, INITIAL ENCOUNTER: Primary | ICD-10-CM

## 2023-05-19 NOTE — PROGRESS NOTES
Assessment/Plan: Patient will be referred to orthopedics we will refrain from prescribing narcotics because of his seizure medicine which will interact with his seizure medicines and that he will use Tylenol for partial pain relief and ice    Problem List Items Addressed This Visit    None  Visit Diagnoses     Tear of right acetabular labrum, initial encounter    -  Primary    Relevant Orders    Ambulatory Referral to Orthopedic Surgery           Diagnoses and all orders for this visit:    Tear of right acetabular labrum, initial encounter  -     Ambulatory Referral to Orthopedic Surgery; Future        No problem-specific Assessment & Plan notes found for this encounter  Subjective:      Patient ID: Cameron Durbin is a 78 y o  male  Patient attempted to do his MRIs he was able to complete the lumbar spine MRI but he was unable to complete the hip MRI his pain is referred right to the groin on the right side laying in bed at night is extremely painful for him he has difficulty ambulating and using the right foot for ascending steps on exam he has pain with external and internal rotation of the femur and especially if it is loaded out with weight while to testing the range of motion is positive straight leg raising at about 50 degrees patient needs an orthopedic evaluation      The following portions of the patient's history were reviewed and updated as appropriate:   He has a past medical history of Cellulitis of leg, Contusion, hip, Hypertension, Laceration of leg, Pelvic hematoma, male, Rheumatoid arthritis(714 0), and Seizures ([address] )  ,  does not have any pertinent problems on file  ,   has a past surgical history that includes Knee surgery; ARTHROSCOPY KNEE (Right); Colonoscopy; and pr arthrocentesis aspir&/inj major jt/bursa w/o us (Right, 2/16/2023)  ,  family history includes Cancer in his father; Heart disease in his maternal grandmother; Parkinsonism in his paternal grandfather  ,   reports that he has been smoking cigars  He has never used smokeless tobacco  He reports current alcohol use of about 6 0 standard drinks per week  He reports that he does not use drugs  ,  is allergic to naproxen and pollen extract     Current Outpatient Medications   Medication Sig Dispense Refill   • acetaminophen (TYLENOL) 500 mg tablet Take 500 mg by mouth every 6 (six) hours as needed for mild pain     • aspirin 325 mg tablet Take 325 mg by mouth daily     • levETIRAcetam (KEPPRA) 500 mg tablet take 2 tablets by mouth twice a day 120 tablet 11   • lisinopril (ZESTRIL) 5 mg tablet Take 1 tablet (5 mg total) by mouth daily 90 tablet 1     No current facility-administered medications for this visit  Review of Systems   Constitutional: Negative for activity change, appetite change, diaphoresis, fatigue and fever  HENT: Negative  Eyes: Negative  Respiratory: Negative for apnea, cough, chest tightness, shortness of breath and wheezing  Cardiovascular: Negative for chest pain, palpitations and leg swelling  Gastrointestinal: Negative for abdominal distention, abdominal pain, anal bleeding, constipation, diarrhea, nausea and vomiting  Endocrine: Negative for cold intolerance, heat intolerance, polydipsia, polyphagia and polyuria  Genitourinary: Negative for difficulty urinating, dysuria, flank pain, hematuria and urgency  Musculoskeletal: Positive for arthralgias, back pain and gait problem  Negative for joint swelling and myalgias  Right groin pain   Skin: Negative for color change, rash and wound  Allergic/Immunologic: Negative for environmental allergies, food allergies and immunocompromised state  Neurological: Negative for dizziness, seizures, syncope, speech difficulty, numbness and headaches  Hematological: Negative for adenopathy  Does not bruise/bleed easily  Psychiatric/Behavioral: Negative for agitation, behavioral problems, hallucinations, sleep disturbance and suicidal ideas  "    Objective:  Vitals:    05/19/23 1311   BP: 124/68   Pulse: 86   SpO2: 98%   Weight: 100 kg (221 lb 6 4 oz)   Height: 6' 1\" (1 854 m)     Body mass index is 29 21 kg/m²  Physical Exam  Constitutional:       General: He is not in acute distress  Appearance: He is well-developed  He is not diaphoretic  HENT:      Head: Normocephalic  Right Ear: External ear normal       Left Ear: External ear normal       Nose: Nose normal    Eyes:      General: No scleral icterus  Right eye: No discharge  Left eye: No discharge  Conjunctiva/sclera: Conjunctivae normal       Pupils: Pupils are equal, round, and reactive to light  Neck:      Thyroid: No thyromegaly  Trachea: No tracheal deviation  Cardiovascular:      Rate and Rhythm: Normal rate and regular rhythm  Heart sounds: Normal heart sounds  No murmur heard  No friction rub  No gallop  Pulmonary:      Effort: Pulmonary effort is normal  No respiratory distress  Breath sounds: Normal breath sounds  No wheezing  Abdominal:      General: Bowel sounds are normal       Palpations: Abdomen is soft  There is no mass  Tenderness: There is no abdominal tenderness  There is no guarding  Musculoskeletal:         General: No deformity  Cervical back: Normal range of motion  Lymphadenopathy:      Cervical: No cervical adenopathy  Skin:     General: Skin is warm and dry  Findings: No erythema or rash  Neurological:      Mental Status: He is alert and oriented to person, place, and time  Cranial Nerves: No cranial nerve deficit  Psychiatric:         Thought Content:  Thought content normal          "
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
## 2023-05-25 ENCOUNTER — TELEPHONE (OUTPATIENT)
Dept: NEUROLOGY | Facility: CLINIC | Age: 68
End: 2023-05-25

## 2023-05-25 ENCOUNTER — OFFICE VISIT (OUTPATIENT)
Dept: NEUROLOGY | Facility: CLINIC | Age: 68
End: 2023-05-25

## 2023-05-25 VITALS
WEIGHT: 221 LBS | BODY MASS INDEX: 29.29 KG/M2 | DIASTOLIC BLOOD PRESSURE: 76 MMHG | HEIGHT: 73 IN | SYSTOLIC BLOOD PRESSURE: 134 MMHG

## 2023-05-25 DIAGNOSIS — G40.909 NONINTRACTABLE EPILEPSY WITHOUT STATUS EPILEPTICUS, UNSPECIFIED EPILEPSY TYPE (HCC): Primary | ICD-10-CM

## 2023-05-25 NOTE — TELEPHONE ENCOUNTER
Patient called and stated that he was seen today with Dr Leon Grajeda and had originally been given an appointment for 8/10 at 12pm but then the appointment was changed to 6/9 at 11am   Patient stated that he was told by Dr Leon Grajeda that it is best for him to be seen in August     Please assist and patient can be reached at [phone]

## 2023-05-25 NOTE — PATIENT INSTRUCTIONS
-- continue to take Levetiracetam 1000 mg twice a day     -- please get a repeat Levetiracetam level checked when you can  You can talk to your PCP if there is any other blood work that you need to check at the same time  -- see Orthopedics as planned
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
## 2023-06-14 ENCOUNTER — TELEPHONE (OUTPATIENT)
Dept: FAMILY MEDICINE CLINIC | Facility: CLINIC | Age: 68
End: 2023-06-14

## 2023-06-14 NOTE — TELEPHONE ENCOUNTER
I called John Arciniega and made him aware of his Pre Op appt / Monday 8/7/23 @ 1:30/ pt told to have any labs/ EKG done days prior to seeing Dr Sukhjinder Campo

## 2023-07-28 ENCOUNTER — RA CDI HCC (OUTPATIENT)
Dept: OTHER | Facility: HOSPITAL | Age: 68
End: 2023-07-28

## 2023-07-28 NOTE — PROGRESS NOTES
[address] [facility] coding opportunities       Chart reviewed, no opportunity found: CHART REVIEWED, NO OPPORTUNITY FOUND        Patients Insurance     Medicare Insurance: Medicare

## 2023-07-31 ENCOUNTER — APPOINTMENT (OUTPATIENT)
Dept: LAB | Facility: HOSPITAL | Age: 68
End: 2023-07-31
Payer: MEDICARE

## 2023-07-31 DIAGNOSIS — Z01.818 OTHER SPECIFIED PRE-OPERATIVE EXAMINATION: ICD-10-CM

## 2023-07-31 DIAGNOSIS — M16.11 PRIMARY LOCALIZED OSTEOARTHROSIS OF RIGHT HIP: ICD-10-CM

## 2023-07-31 DIAGNOSIS — Z51.81 ENCOUNTER FOR THERAPEUTIC DRUG MONITORING: ICD-10-CM

## 2023-07-31 DIAGNOSIS — G40.909 NONINTRACTABLE EPILEPSY WITHOUT STATUS EPILEPTICUS, UNSPECIFIED EPILEPSY TYPE (HCC): ICD-10-CM

## 2023-07-31 LAB
ALBUMIN SERPL BCP-MCNC: 4 G/DL (ref 3.5–5)
ALP SERPL-CCNC: 68 U/L (ref 34–104)
ALT SERPL W P-5'-P-CCNC: 11 U/L (ref 7–52)
ANION GAP SERPL CALCULATED.3IONS-SCNC: 11 MMOL/L
APTT PPP: 34 SECONDS (ref 23–37)
AST SERPL W P-5'-P-CCNC: 17 U/L (ref 13–39)
ATRIAL RATE: 86 BPM
BASOPHILS # BLD AUTO: 0.05 THOUSANDS/ÂΜL (ref 0–0.1)
BASOPHILS NFR BLD AUTO: 1 % (ref 0–1)
BILIRUB SERPL-MCNC: 0.7 MG/DL (ref 0.2–1)
BUN SERPL-MCNC: 11 MG/DL (ref 5–25)
CALCIUM SERPL-MCNC: 9.3 MG/DL (ref 8.4–10.2)
CHLORIDE SERPL-SCNC: 103 MMOL/L (ref 96–108)
CO2 SERPL-SCNC: 21 MMOL/L (ref 21–32)
CREAT SERPL-MCNC: 0.82 MG/DL (ref 0.6–1.3)
EOSINOPHIL # BLD AUTO: 0.06 THOUSAND/ÂΜL (ref 0–0.61)
EOSINOPHIL NFR BLD AUTO: 1 % (ref 0–6)
ERYTHROCYTE [DISTWIDTH] IN BLOOD BY AUTOMATED COUNT: 12.4 % (ref 11.6–15.1)
ERYTHROCYTE [SEDIMENTATION RATE] IN BLOOD: 16 MM/HOUR (ref 0–19)
GFR SERPL CREATININE-BSD FRML MDRD: 92 ML/MIN/1.73SQ M
GLUCOSE SERPL-MCNC: 151 MG/DL (ref 65–140)
HCT VFR BLD AUTO: 46.6 % (ref 36.5–49.3)
HGB BLD-MCNC: 16 G/DL (ref 12–17)
IMM GRANULOCYTES # BLD AUTO: 0.01 THOUSAND/UL (ref 0–0.2)
IMM GRANULOCYTES NFR BLD AUTO: 0 % (ref 0–2)
INR PPP: 1 (ref 0.84–1.19)
LYMPHOCYTES # BLD AUTO: 1.69 THOUSANDS/ÂΜL (ref 0.6–4.47)
LYMPHOCYTES NFR BLD AUTO: 35 % (ref 14–44)
MCH RBC QN AUTO: 32.6 PG (ref 26.8–34.3)
MCHC RBC AUTO-ENTMCNC: 34.3 G/DL (ref 31.4–37.4)
MCV RBC AUTO: 95 FL (ref 82–98)
MONOCYTES # BLD AUTO: 0.37 THOUSAND/ÂΜL (ref 0.17–1.22)
MONOCYTES NFR BLD AUTO: 8 % (ref 4–12)
NEUTROPHILS # BLD AUTO: 2.6 THOUSANDS/ÂΜL (ref 1.85–7.62)
NEUTS SEG NFR BLD AUTO: 55 % (ref 43–75)
NRBC BLD AUTO-RTO: 0 /100 WBCS
P AXIS: 51 DEGREES
PLATELET # BLD AUTO: 283 THOUSANDS/UL (ref 149–390)
PMV BLD AUTO: 8.4 FL (ref 8.9–12.7)
POTASSIUM SERPL-SCNC: 3.9 MMOL/L (ref 3.5–5.3)
PR INTERVAL: 156 MS
PROT SERPL-MCNC: 6.7 G/DL (ref 6.4–8.4)
PROTHROMBIN TIME: 13.4 SECONDS (ref 11.6–14.5)
QRS AXIS: 50 DEGREES
QRSD INTERVAL: 82 MS
QT INTERVAL: 368 MS
QTC INTERVAL: 440 MS
RBC # BLD AUTO: 4.91 MILLION/UL (ref 3.88–5.62)
SODIUM SERPL-SCNC: 135 MMOL/L (ref 135–147)
T WAVE AXIS: 59 DEGREES
VENTRICULAR RATE: 86 BPM
WBC # BLD AUTO: 4.78 THOUSAND/UL (ref 4.31–10.16)

## 2023-07-31 PROCEDURE — 36415 COLL VENOUS BLD VENIPUNCTURE: CPT

## 2023-07-31 PROCEDURE — 85730 THROMBOPLASTIN TIME PARTIAL: CPT

## 2023-07-31 PROCEDURE — 80177 DRUG SCRN QUAN LEVETIRACETAM: CPT

## 2023-07-31 PROCEDURE — 85610 PROTHROMBIN TIME: CPT

## 2023-07-31 PROCEDURE — 80053 COMPREHEN METABOLIC PANEL: CPT

## 2023-07-31 PROCEDURE — 85652 RBC SED RATE AUTOMATED: CPT

## 2023-07-31 PROCEDURE — 93010 ELECTROCARDIOGRAM REPORT: CPT | Performed by: INTERNAL MEDICINE

## 2023-07-31 PROCEDURE — 85025 COMPLETE CBC W/AUTO DIFF WBC: CPT

## 2023-08-02 LAB — LEVETIRACETAM SERPL-MCNC: 33.8 UG/ML (ref 10–40)

## 2023-08-07 ENCOUNTER — CONSULT (OUTPATIENT)
Dept: FAMILY MEDICINE CLINIC | Facility: CLINIC | Age: 68
End: 2023-08-07
Payer: MEDICARE

## 2023-08-07 VITALS
HEART RATE: 85 BPM | TEMPERATURE: 96.8 F | WEIGHT: 216 LBS | HEIGHT: 73 IN | OXYGEN SATURATION: 96 % | SYSTOLIC BLOOD PRESSURE: 104 MMHG | BODY MASS INDEX: 28.63 KG/M2 | RESPIRATION RATE: 17 BRPM | DIASTOLIC BLOOD PRESSURE: 70 MMHG

## 2023-08-07 DIAGNOSIS — T14.8XXA ABRASION: Primary | ICD-10-CM

## 2023-08-07 DIAGNOSIS — Z01.818 PREOP EXAMINATION: ICD-10-CM

## 2023-08-07 PROCEDURE — 99214 OFFICE O/P EST MOD 30 MIN: CPT | Performed by: FAMILY MEDICINE
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
## 2023-08-10 ENCOUNTER — OFFICE VISIT (OUTPATIENT)
Dept: NEUROLOGY | Facility: CLINIC | Age: 68
End: 2023-08-10
Payer: MEDICARE

## 2023-08-10 VITALS
SYSTOLIC BLOOD PRESSURE: 138 MMHG | HEART RATE: 115 BPM | HEIGHT: 73 IN | DIASTOLIC BLOOD PRESSURE: 71 MMHG | BODY MASS INDEX: 28.89 KG/M2 | WEIGHT: 218 LBS

## 2023-08-10 DIAGNOSIS — M16.10 HIP ARTHRITIS: ICD-10-CM

## 2023-08-10 DIAGNOSIS — G40.909 NONINTRACTABLE EPILEPSY WITHOUT STATUS EPILEPTICUS, UNSPECIFIED EPILEPSY TYPE (HCC): Primary | ICD-10-CM

## 2023-08-10 PROCEDURE — 99214 OFFICE O/P EST MOD 30 MIN: CPT | Performed by: PSYCHIATRY & NEUROLOGY

## 2023-08-10 NOTE — PROGRESS NOTES
Patient ID: Lashaun Durand is a 80 y.o. male with hypertension, hyperlipidemia, and localization related epilepsy, who is returning to Neurology office for follow up of his seizures. Assessment/Plan:    Epilepsy ([address])  He is not having any additional seizures and has been doing well with only recent events being in the setting of medication noncompliance. It will be important to continue to stay on Levetiracetam going forward, especially around the time of his upcoming hip surgery. -- he will continue Levetiracetam 1000 mg twice a day, unchanged. If he would have additional seizures while compliant, he would benefit from having his dose increased. Hip arthritis  He is getting hip replacement surgery coming up. He is cleared from a neurologic perspective. I discussed how I would recommend that he continue to take his Levetiracetam including the day of surgery. He will Return in about 3 months (around 11/10/2023). Subjective:    HPI  Current seizure medications:  1. Levetiracetam 1000 mg twice a day  Other medications as per [facility]. Since his last visit, he has been doing well. He has been strictly compliant with his medication and has not had any additional seizures. He has been doing well. He is planning on getting a hip replacement coming up. He did ask for surgical clearance and for instructions with his medications. Prior Seizure Medications: none       Objective:    Blood pressure 138/71, pulse (!) 115, height 6' 1" (1.854 m), weight 98.9 kg (218 lb). Physical Exam    Neurological Exam      ROS:    Review of Systems   Constitutional: Negative for appetite change, fatigue and fever. HENT: Negative. Negative for hearing loss, tinnitus, trouble swallowing and voice change. Eyes: Negative. Negative for photophobia, pain and visual disturbance. Respiratory: Negative. Negative for shortness of breath. Cardiovascular: Negative. Negative for palpitations. Gastrointestinal: Negative. Negative for nausea and vomiting. Endocrine: Negative. Negative for cold intolerance. Genitourinary: Negative. Negative for dysuria, frequency and urgency. Musculoskeletal: Negative for back pain, gait problem, myalgias and neck pain. Skin: Negative. Negative for rash. Allergic/Immunologic: Negative. Neurological: Negative. Negative for dizziness, tremors, seizures, syncope, facial asymmetry, speech difficulty, weakness, light-headedness, numbness and headaches. Hematological: Negative. Does not bruise/bleed easily. Psychiatric/Behavioral: Negative. Negative for confusion, hallucinations and sleep disturbance. All other systems reviewed and are negative. I personally reviewed the ROS that was entered by the medical assistant    Voice recognition software was used in the generation of this note. There may be unintentional errors including grammatical errors, spelling errors, or pronoun errors.

## 2023-08-10 NOTE — LETTER
To Whom This May Concern,    Estuardo Carson 1955 is under my Neurological care. He is cleared from a neurologic perspective for surgery. Melo Edwards should take his antiepileptic medication as prescribed including the morning of the procedure.      Sincerely,      Joe Bauer MD   [address] [facility]

## 2023-08-13 PROBLEM — M16.10 HIP ARTHRITIS: Status: ACTIVE | Noted: 2023-08-13

## 2023-08-14 NOTE — ASSESSMENT & PLAN NOTE
He is not having any additional seizures and has been doing well with only recent events being in the setting of medication noncompliance. It will be important to continue to stay on Levetiracetam going forward, especially around the time of his upcoming hip surgery. -- he will continue Levetiracetam 1000 mg twice a day, unchanged. If he would have additional seizures while compliant, he would benefit from having his dose increased.

## 2023-08-14 NOTE — ASSESSMENT & PLAN NOTE
He is getting hip replacement surgery coming up. He is cleared from a neurologic perspective. I discussed how I would recommend that he continue to take his Levetiracetam including the day of surgery.

## 2023-08-16 DIAGNOSIS — T14.8XXA ABRASION: ICD-10-CM

## 2023-08-28 ENCOUNTER — EVALUATION (OUTPATIENT)
Dept: PHYSICAL THERAPY | Facility: HOME HEALTHCARE | Age: 68
End: 2023-08-28
Payer: MEDICARE

## 2023-08-28 DIAGNOSIS — Z96.641 STATUS POST TOTAL REPLACEMENT OF RIGHT HIP: Primary | ICD-10-CM

## 2023-08-28 PROCEDURE — 97110 THERAPEUTIC EXERCISES: CPT

## 2023-08-28 PROCEDURE — 97162 PT EVAL MOD COMPLEX 30 MIN: CPT

## 2023-08-28 NOTE — LETTER
2023    Andreia Childers MD  [address] [place] [address],[address] [id number]    Patient: Barbie Montez   YOB: 1955   Date of Visit: 2023     Encounter Diagnosis     ICD-10-CM    1. 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 Nik Heller: Thank you for your recent referral of Barbie Montez. Please review the attached evaluation summary from Adriel's recent visit. Please verify that you agree with the plan of care by signing the attached order. If you have any questions or concerns, please do not hesitate to call. I sincerely appreciate the opportunity to share in the care of one of your patients and hope to have another opportunity to work with you in the near future. Sincerely,    Haley Muir, PT      Referring Provider:      I certify that I have read the below Plan of Care and certify the need for these services furnished under this plan of treatment while under my care. Andreia Childers MD  [address] [place] [address],[address] [id number]  Via Fax: [phone]          PT Evaluation     Today's date: 2023  Patient name: Barbie Montez  : 1955  MRN: [id number]  Referring provider: Andreia Childers MD  Dx:   Encounter Diagnosis     ICD-10-CM    1. Status post total replacement of right hip  Z96.641           Start Time: 1315  Stop Time: 7663  Total time in clinic (min): 33 minutes    Assessment  Assessment details: Pt is a 81 y/o male presenting to [address] [place] [address] s/p a R LORENA which was performed on 2023. The pt is presenting with pain, strength deficits, and decreased functional mobility, which is affecting his ability to perform ADLs. The pt is currently independent for ambulation with a RW, but does demonstrate a mild antalgic gait pattern. The pt requires skilled PT in order to improve in pain, strength, ROM, functional mobility, quality of life, and return to PLOF. Thank you.   Impairments: abnormal gait, abnormal or restricted ROM, abnormal movement, activity intolerance, impaired physical strength, lacks appropriate home exercise program, pain with function, weight-bearing intolerance and poor body mechanics  Understanding of Dx/Px/POC: good   Prognosis: good    Goals  STG: 3-4 weeks  Pt will be independent with HEP in order to continue to progress outside of PT treatment sessions. Pt will improve in quality of life as demonstrated by worst pain levels of 5/10 or less. Pt will improve in functional mobility as demonstrated by ability to ambulate independently w/o an AD. LT-8 weeks  Pt will improve in quality of life as demonstrated by worst pain levels of 2/10 or less. Pt will improve in functional mobility as demonstrated by strength levels of 4+/5 or greater. Pt will improve in functional mobility as demonstrated by R hip ROM WFL. Pt will improve in functional mobility as demonstrated by ability to perform full ADLs without any limitations due to pain, ROM deficits, or weakness. Plan  Patient would benefit from: skilled physical therapy  Planned modality interventions: cryotherapy  Planned therapy interventions: balance/weight bearing training, body mechanics training, flexibility, functional ROM exercises, gait training, home exercise program, manual therapy, neuromuscular re-education, patient education, postural training, strengthening, stretching, therapeutic activities and therapeutic exercise  Frequency: 2x week  Duration in weeks: 12  Plan of Care beginning date: 2023  Plan of Care expiration date: 2023  Treatment plan discussed with: patient        Subjective Evaluation    History of Present Illness  Mechanism of injury: Pt is presenting to OPPT s/p a R LORENA, which was performed on 2023. He reports that the initial pain he had before surgery has improved, but that now he is mostly experiencing post-op pain rather than the pre-surgical pain.   He reports that now he is much more comfortable in a recliner, but that he still has difficulty lifting his R leg due to weakness in his thigh muscle. He also reports that he has been having some swelling in his R LE. The pt is currently independent for ambulation with a RW and that he has been walking more each day, with the biggest improvement happening Saturday into . He reports that he has been doing some ankle pumps and quad sets for exercises. Patient Goals  Patient goals for therapy: decreased pain, increased motion, increased strength, independence with ADLs/IADLs and return to sport/leisure activities    Pain  Current pain ratin  At best pain ratin  At worst pain rating: 10  Relieving factors: rest, medications and ice (Reclining)    Social Support  Steps to enter house: yes  Stairs in house: yes     Treatments  Previous treatment: physical therapy        Objective     Observations     Right Hip  Positive for effusion. Neurological Testing     Sensation     Hip   Left Hip   Intact: light touch    Right Hip   Intact: light touch    Passive Range of Motion   Left Hip   Flexion: WFL  Abduction: [facility]  External rotation (90/90): [facility]  Internal rotation (90/90):  WFL    Right Hip   Flexion: 90 degrees   Abduction: 20 degrees     Strength/Myotome Testing     Left Hip   Planes of Motion   Flexion: 4+  Abduction: 4+  Adduction: 4+    Right Hip   Planes of Motion   Flexion: 3-  Abduction: 3  Adduction: 3+    Left Knee   Flexion: 4+  Extension: 4+    Right Knee   Flexion: 3+  Extension: 3+    Ambulation     Ambulation: Level Surfaces   Ambulation with assistive device: independent    Additional Level Surfaces Ambulation Details  RW    Ambulation: Stairs   Ascend stairs: contact guard assist  Pattern: non-reciprocal  Railings: one rail  Descend stairs: contact guard assist  Pattern: non-reciprocal  Railings: one rail  Curbs: independent    Observational Gait   Gait: antalgic   Decreased walking speed, stride length and right stance time.              Precautions: R LORENA 08/23/2023    Re-eval Date: 09/28/2023      Manuals 8/28            R hip PROM JA            Calf, hs, glute stretch                                       Neuro Re-Ed             Balance as appropriate                                                    Ther Ex             Nustep             Glute set             Hip abd w/ sliding board HEP            Heel slide HEP            Hip abd             Hip add             LAQ HEP            HS curls             SLR HEP            Bridges             Clamshells             S/L hip abd             Standing hip flex/abd/ext             Squat             Leg press             Ther Activity             Step up             Step down             Gait Training                                       Modalities             CP

## 2023-08-28 NOTE — PROGRESS NOTES
PT Evaluation     Today's date: 2023  Patient name: Idania Mccallum  : 1955  MRN: [id number]  Referring provider: Chelsie Dubon MD  Dx:   Encounter Diagnosis     ICD-10-CM    1. Status post total replacement of right hip  Z96.641           Start Time: 1315  Stop Time: 9180  Total time in clinic (min): 33 minutes    Assessment  Assessment details: Pt is a 79 y/o male presenting to [address] [place] [address] s/p a R LORENA which was performed on 2023. The pt is presenting with pain, strength deficits, and decreased functional mobility, which is affecting his ability to perform ADLs. The pt is currently independent for ambulation with a RW, but does demonstrate a mild antalgic gait pattern. The pt requires skilled PT in order to improve in pain, strength, ROM, functional mobility, quality of life, and return to PLOF. Thank you. Impairments: abnormal gait, abnormal or restricted ROM, abnormal movement, activity intolerance, impaired physical strength, lacks appropriate home exercise program, pain with function, weight-bearing intolerance and poor body mechanics  Understanding of Dx/Px/POC: good   Prognosis: good    Goals  STG: 3-4 weeks  Pt will be independent with HEP in order to continue to progress outside of PT treatment sessions. Pt will improve in quality of life as demonstrated by worst pain levels of 5/10 or less. Pt will improve in functional mobility as demonstrated by ability to ambulate independently w/o an AD. LT-8 weeks  Pt will improve in quality of life as demonstrated by worst pain levels of 2/10 or less. Pt will improve in functional mobility as demonstrated by strength levels of 4+/5 or greater. Pt will improve in functional mobility as demonstrated by R hip ROM WFL. Pt will improve in functional mobility as demonstrated by ability to perform full ADLs without any limitations due to pain, ROM deficits, or weakness.       Plan  Patient would benefit from: skilled physical therapy  Planned modality interventions: cryotherapy  Planned therapy interventions: balance/weight bearing training, body mechanics training, flexibility, functional ROM exercises, gait training, home exercise program, manual therapy, neuromuscular re-education, patient education, postural training, strengthening, stretching, therapeutic activities and therapeutic exercise  Frequency: 2x week  Duration in weeks: 12  Plan of Care beginning date: 2023  Plan of Care expiration date: 2023  Treatment plan discussed with: patient        Subjective Evaluation    History of Present Illness  Mechanism of injury: Pt is presenting to OPPT s/p a R LORENA, which was performed on 2023. He reports that the initial pain he had before surgery has improved, but that now he is mostly experiencing post-op pain rather than the pre-surgical pain. He reports that now he is much more comfortable in a recliner, but that he still has difficulty lifting his R leg due to weakness in his thigh muscle. He also reports that he has been having some swelling in his R LE. The pt is currently independent for ambulation with a RW and that he has been walking more each day, with the biggest improvement happening Saturday into . He reports that he has been doing some ankle pumps and quad sets for exercises. Patient Goals  Patient goals for therapy: decreased pain, increased motion, increased strength, independence with ADLs/IADLs and return to sport/leisure activities    Pain  Current pain ratin  At best pain ratin  At worst pain rating: 10  Relieving factors: rest, medications and ice (Reclining)    Social Support  Steps to enter house: yes  Stairs in house: yes     Treatments  Previous treatment: physical therapy        Objective     Observations     Right Hip  Positive for effusion.      Neurological Testing     Sensation     Hip   Left Hip   Intact: light touch    Right Hip   Intact: light touch    Passive Range of Motion   Left Hip   Flexion: WFL  Abduction: [facility]  [facility]  Internal rotation (90/90): WFL    Right Hip   Flexion: 90 degrees   Abduction: 20 degrees     Strength/Myotome Testing     Left Hip   Planes of Motion   Flexion: 4+  Abduction: 4+  Adduction: 4+    Right Hip   Planes of Motion   Flexion: 3-  Abduction: 3  Adduction: 3+    Left Knee   Flexion: 4+  Extension: 4+    Right Knee   Flexion: 3+  Extension: 3+    Ambulation     Ambulation: Level Surfaces   Ambulation with assistive device: independent    Additional Level Surfaces Ambulation Details  RW    Ambulation: Stairs   Ascend stairs: contact guard assist  Pattern: non-reciprocal  Railings: one rail  Descend stairs: contact guard assist  Pattern: non-reciprocal  Railings: one rail  Curbs: independent    Observational Gait   Gait: antalgic   Decreased walking speed, stride length and right stance time.               Precautions: R LORENA 08/23/2023    Re-eval Date: 09/28/2023      Manuals 8/28            R hip PROM JA            Calf, hs, glute stretch                                       Neuro Re-Ed             Balance as appropriate                                                    Ther Ex             Nustep             Glute set             Hip abd w/ sliding board HEP            Heel slide HEP            Hip abd             Hip add             LAQ HEP            HS curls             SLR HEP            Bridges             Clamshells             S/L hip abd             Standing hip flex/abd/ext             Squat             Leg press             Ther Activity             Step up             Step down             Gait Training                                       Modalities             CP

## 2023-08-30 ENCOUNTER — OFFICE VISIT (OUTPATIENT)
Dept: PHYSICAL THERAPY | Facility: HOME HEALTHCARE | Age: 68
End: 2023-08-30
Payer: MEDICARE

## 2023-08-30 DIAGNOSIS — Z96.641 STATUS POST TOTAL REPLACEMENT OF RIGHT HIP: Primary | ICD-10-CM

## 2023-08-30 PROCEDURE — 97110 THERAPEUTIC EXERCISES: CPT

## 2023-08-30 PROCEDURE — 97140 MANUAL THERAPY 1/> REGIONS: CPT

## 2023-08-30 NOTE — PROGRESS NOTES
Daily Note     Today's date: 2023  Patient name: Ewa Villagomez  : 1955  MRN: [id number]  Referring provider: Stefania Wolf MD  Dx: No diagnosis found. Start Time: 1130          Subjective: I am feeling better and noticed improved strength with ADL's. Objective: See treatment diary below    Assessment: Pt with good reginaldo to additional ex as per flow sheet. Pt with vc's needed for correct form and assistance needed to perform supine SLR flex. Pt also with c/o R knee ROM limitations that limit progress with R hip ex as pt c/o R knee pain with trial of step up ex. . Pt declined CP at end and reports use at home as needed. . Patient would benefit from continued PT    Plan: Continue per plan of care.       Precautions: R LORENA 2023    Re-eval Date: 2023      Manuals -30           R hip PROM JA EC           Calf, hs, glute stretch                                       Neuro Re-Ed             Balance as appropriate                                                    Ther Ex  -           Nustep  L1  9'           Glute set             Hip abd w/ sliding board HEP 1x10           Heel slide flex  w/slide board HEP 1x10           Hip abd             Hip add  3" 1x10           LAQ HEP 5" 1x10           HS curls             SLR HEP AAROM3" x10           Bridges  3"1x10           Clamshells             S/L hip abd                          Standing hip flex/abd/ext  Flex/abd  1x10 maria elena           Squat  1x5           Leg press             Ther Activity             Step up  B 1x5           Step down             Gait Training                                       Modalities             CP

## 2023-09-05 ENCOUNTER — OFFICE VISIT (OUTPATIENT)
Dept: PHYSICAL THERAPY | Facility: HOME HEALTHCARE | Age: 68
End: 2023-09-05
Payer: MEDICARE

## 2023-09-05 DIAGNOSIS — Z96.641 STATUS POST TOTAL REPLACEMENT OF RIGHT HIP: Primary | ICD-10-CM

## 2023-09-05 PROCEDURE — 97110 THERAPEUTIC EXERCISES: CPT

## 2023-09-05 PROCEDURE — 97140 MANUAL THERAPY 1/> REGIONS: CPT

## 2023-09-05 NOTE — PROGRESS NOTES
Daily Note     Today's date: 2023  Patient name: Viki Rehman  : 1955  MRN: [id number]  Referring provider: Americo Saunders MD  Dx:   Encounter Diagnosis     ICD-10-CM    1. Status post total replacement of right hip  Z96.641           Start Time: 1105  Stop Time: 1150  Total time in clinic (min): 45 minutes    Subjective: Pt reports that his hip gets a little stiff, but that it loosens up when he uses it. Objective: See treatment diary below      Assessment: Pt demonstrated good tolerance to treatment session and was able to progress with strengthening exercises. He does continue to have difficulty with SLR due to quad and hip flexor weakness, but was able to perform the exercise without assistance today. The pt would benefit from continued PT. Plan: Continue per plan of care.       Precautions: R LORENA 2023    Re-eval Date: 2023      Manuals  9          R hip PROM JA EC JA          Calf, hs stretch   JA                                    Neuro Re-Ed             Balance as appropriate                                                    Ther Ex             Nustep  L1  9' L1 10'          Glute set             Hip abd w/ sliding board HEP 1x10 1x15          Heel slide flex  w/slide board HEP 1x10 1x15          Hip abd             Hip add  3" 1x10 3" 1x15          LAQ HEP 5" 1x10 5" x20          HS curls             SLR HEP AAROM3" x10 AROM 1x10          Bridges  3"1x10 3" 1x10          Clamshells             S/L hip abd                          Standing hip flex/abd/ext  Flex/abd  1x10 maria elena Flex/abd  1x20 R only          Squat  1x5 x12          Leg press             Ther Activity             Step up  B 1x5 C 1x12 R          Step down             Gait Training                                       Modalities             CP

## 2023-09-07 ENCOUNTER — OFFICE VISIT (OUTPATIENT)
Dept: PHYSICAL THERAPY | Facility: HOME HEALTHCARE | Age: 68
End: 2023-09-07
Payer: MEDICARE

## 2023-09-07 DIAGNOSIS — Z96.641 STATUS POST TOTAL REPLACEMENT OF RIGHT HIP: Primary | ICD-10-CM

## 2023-09-07 PROCEDURE — 97110 THERAPEUTIC EXERCISES: CPT | Performed by: PHYSICAL THERAPIST

## 2023-09-07 PROCEDURE — 97140 MANUAL THERAPY 1/> REGIONS: CPT | Performed by: PHYSICAL THERAPIST

## 2023-09-07 NOTE — PROGRESS NOTES
Daily Note     Today's date: 2023  Patient name: Bebeto Bergeron  : 1955  MRN: [id number]  Referring provider: Acacia Horvath MD  Dx:   Encounter Diagnosis     ICD-10-CM    1. Status post total replacement of right hip  Z96.641                      Subjective: The patient states that he has been doing better each day. Feels he is moving better and has less pain in his hip. Objective: See treatment diary below      Assessment: Tolerated treatment well. Able to progress TE as outlined below in daily treatment diary. He continues to ambulate with RW. Novelty good at end of session with no increase in pain. Patient demonstrated fatigue post treatment and would benefit from continued PT to improve function and mobility. Plan: Continue per plan of care. Progress as able in upcoming visits.        Precautions: R LORENA 2023    Re-eval Date: 2023      Manuals          R hip PROM JA EC CHANDU ML         Calf, hs stretch   JA ML                                   Neuro Re-Ed             Balance as appropriate                                                    Ther Ex             Nustep  L1  9' L1 10' L2 10'         Glute set             Hip abd w/ sliding board HEP 1x10 1x15 15x         Heel slide flex  w/slide board HEP 1x10 1x15 15x         Hip abd             Hip add  3" 1x10 3" 1x15 3" 2x10         LAQ HEP 5" 1x10 5" x20 5"x20         HS curls             SLR HEP AAROM3" x10 AROM 1x10 AROM 10x  5x         Bridges  3"1x10 3" 1x10 3"x10         Clamshells             S/L hip abd             HR    2x10         Standing hip flex/abd/ext  Flex/abd  1x10 maria elena Flex/abd  1x20 R only Flex  Abd 20x ea  R only         Squat  1x5 x12 2x10         Leg press             Ther Activity             Step up  B 1x5 C 1x12 R C   R foot  10x         Step down             Gait Training                                       Modalities             CP

## 2023-09-11 ENCOUNTER — OFFICE VISIT (OUTPATIENT)
Dept: PHYSICAL THERAPY | Facility: HOME HEALTHCARE | Age: 68
End: 2023-09-11
Payer: MEDICARE

## 2023-09-11 DIAGNOSIS — Z96.641 STATUS POST TOTAL REPLACEMENT OF RIGHT HIP: Primary | ICD-10-CM

## 2023-09-11 PROCEDURE — 97140 MANUAL THERAPY 1/> REGIONS: CPT

## 2023-09-11 PROCEDURE — 97110 THERAPEUTIC EXERCISES: CPT

## 2023-09-11 NOTE — PROGRESS NOTES
Daily Note     Today's date: 2023  Patient name: Renae Pedraza  : 1955  MRN: [id number]  Referring provider: Anyi Cueto MD  Dx: No diagnosis found. Start Time: 1255          Subjective: I am feeling better everyday. The discomfort that I have is just post operative. Objective: See treatment diary below    Assessment: Pt reginaldo session well. Pt with c/o R knee and groin area pain more than R hip pain. Pt able to complete all TE well with alternating ex to avoid fatigue. Pt with strength and ROM deficits evident and would benefit from continued PT. Pt to use CP at home as needed. Plan: Continue per plan of care.       Precautions: R LORENA 2023    Re-eval Date: 2023      Manuals         R hip PROM JA EC JA ML EC        Calf, hs stretch   JA ML EC                                  Neuro Re-Ed          Balance as appropriate                                                    Ther Ex          Nustep  L1  9' L1 10' L2 10' L2  10'        Glute set             Hip abd w/ sliding board HEP 1x10 1x15 15x x15        Heel slide flex  w/slide board HEP 1x10 1x15 15x x15        Hip abd             Hip add  3" 1x10 3" 1x15 3" 2x10 3"   2x10        LAQ HEP 5" 1x10 5" x20 5"x20 5" x20        HS curls             SLR HEP AAROM3" x10 AROM 1x10 AROM 10x  5x AROM  10x  5x        Bridges  3"1x10 3" 1x10 3"x10 3" x10        Clamshells             S/L hip abd             HR    2x10 2x10        Standing hip flex/abd/ext  Flex/abd  1x10 maria elena Flex/abd  1x20 R only Flex  Abd 20x ea  R only Flex   maria elena x20  Abd   maria elena 2x10  alt          Squat  1x5 x12 2x10 2x10        Leg press             Ther Activity             Step up  B 1x5 C 1x12 R C   R foot  10x C R foot  x10        Step down             Gait Training                                       Modalities             CP

## 2023-09-13 ENCOUNTER — APPOINTMENT (OUTPATIENT)
Dept: PHYSICAL THERAPY | Facility: HOME HEALTHCARE | Age: 68
End: 2023-09-13
Payer: MEDICARE

## 2023-09-14 ENCOUNTER — APPOINTMENT (OUTPATIENT)
Dept: PHYSICAL THERAPY | Facility: HOME HEALTHCARE | Age: 68
End: 2023-09-14
Payer: MEDICARE

## 2023-09-18 ENCOUNTER — OFFICE VISIT (OUTPATIENT)
Dept: PHYSICAL THERAPY | Facility: HOME HEALTHCARE | Age: 68
End: 2023-09-18
Payer: MEDICARE

## 2023-09-18 DIAGNOSIS — Z96.641 STATUS POST TOTAL REPLACEMENT OF RIGHT HIP: Primary | ICD-10-CM

## 2023-09-18 PROCEDURE — 97110 THERAPEUTIC EXERCISES: CPT

## 2023-09-18 PROCEDURE — 97140 MANUAL THERAPY 1/> REGIONS: CPT

## 2023-09-18 NOTE — PROGRESS NOTES
Daily Note     Today's date: 2023  Patient name: Babatunde Penny  : 1955  MRN: [id number]  Referring provider: Cruz Dancer, MD  Dx:   Encounter Diagnosis     ICD-10-CM    1. 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 Pt reports his R hip feels like a bruised soreness. Objective: See treatment diary below      Assessment: Tolerated treatment well. Some verbal cues needed t/o exercises to perform correctly. Gait training with [facility] today with verbal cues needed for correct gait sequence. ROM and strength deficits noted R LE. Patient would benefit from continued PT      Plan: Continue per plan of care.       Precautions: R LORENA 2023    Re-eval Date: 2023      Manuals        R hip PROM JA EC JA ML EC JK       Calf, hs stretch   JA ML EC JK                                 Neuro Re-Ed          Balance as appropriate                                                    Ther Ex          Nustep  L1  9' L1 10' L2 10' L2  10' L2  10'       Glute set             Hip abd w/ sliding board HEP 1x10 1x15 15x x15 x15       Heel slide flex  w/slide board HEP 1x10 1x15 15x x15 x15       Hip abd             Hip add  3" 1x10 3" 1x15 3" 2x10 3"   2x10 3"  2x10       LAQ HEP 5" 1x10 5" x20 5"x20 5" x20 5"x20       HS curls             SLR HEP AAROM3" x10 AROM 1x10 AROM 10x  5x AROM  10x  5x AROM x 10       Bridges  3"1x10 3" 1x10 3"x10 3" x10 3"x 10       Clamshells             S/L hip abd             HR    2x10 2x10 2x10       Standing hip flex/abd/ext  Flex/abd  1x10 maria elena Flex/abd  1x20 R only Flex  Abd 20x ea  R only Flex   maria elena x20  Abd   maria elena 2x10  alt   Flex/abd x 20 ea        Squat  1x5 x12 2x10 2x10 2x10        Leg press             Ther Activity             Step up  B 1x5 C 1x12 R C   R foot  10x C R foot  x10 C R foot   x10       Step down             Gait Training With SPC  1 lap                     Modalities             CP

## 2023-09-20 ENCOUNTER — OFFICE VISIT (OUTPATIENT)
Dept: PHYSICAL THERAPY | Facility: HOME HEALTHCARE | Age: 68
End: 2023-09-20
Payer: MEDICARE

## 2023-09-20 DIAGNOSIS — Z96.641 STATUS POST TOTAL REPLACEMENT OF RIGHT HIP: Primary | ICD-10-CM

## 2023-09-20 PROCEDURE — 97110 THERAPEUTIC EXERCISES: CPT

## 2023-09-20 PROCEDURE — 97140 MANUAL THERAPY 1/> REGIONS: CPT

## 2023-09-20 NOTE — PROGRESS NOTES
Daily Note     Today's date: 2023  Patient name: Jesus Stanley  : 1955  MRN: [id number]   Referring provider: Niles Doe MD  Dx:   Encounter Diagnosis     ICD-10-CM    1. 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 Objective: See treatment diary below      Assessment: Tolerated treatment well. Patient participated in skilled PT session focused on strengthening, stretching, and ROM. Patient able to complete exercise program with no increase in sx. Patient experienced a relief in groin pain/tightness after manuals. Patient continues to improved gait with use of SPC. Patient starts out with antalgic gait pattern, and improves with increased walking. Patient is easily distracted, needing v.c. for staying on task. Patient would continue to benefit from skilled PT interventions to address strengthening, stretching, and ROM. Patient demonstrated fatigue post treatment and exhibited good technique with therapeutic exercises      Plan: Continue per plan of care.        Precautions: R LORENA 2023    Re-eval Date: 2023      Manuals       R hip PROM JA EC CHANDU ALBRIGHT CD      Calf, hs stretch   CHANDU ALBRIGHT CD                                Neuro Re-Ed          Balance as appropriate                                                    Ther Ex          Nustep  L1  9' L1 10' L2 10' L2  10' L2  10' L2 x 10'       Glute set             Hip abd w/ sliding board HEP 1x10 1x15 15x x15 x15 15x      Heel slide flex  w/slide board HEP 1x10 1x15 15x x15 x15 15x      Hip abd             Hip add  3" 1x10 3" 1x15 3" 2x10 3"   2x10 3"  2x10 5" 20x      LAQ HEP 5" 1x10 5" x20 5"x20 5" x20 5"x20 5" 20x      HS curls             SLR HEP AAROM3" x10 AROM 1x10 AROM 10x  5x AROM  10x  5x AROM x 10 AROM 15x      Bridges  3"1x10 3" 1x10 3"x10 3" x10 3"x 10 3" 10x       Clamshells             S/L hip abd             HR    2x10 2x10 2x10 30x      Standing hip flex/abd/ext  Flex/abd  1x10 maria elena Flex/abd  1x20 R only Flex  Abd 20x ea  R only Flex   maria elena x20  Abd   maria elena 2x10  alt   Flex/abd x 20 ea  Flex/abd 20x ea      Squat  1x5 x12 2x10 2x10 2x10  2x10      Leg press             Ther Activity             Step up  B 1x5 C 1x12 R C   R foot  10x C R foot  x10 C R foot   x10 C step R foot 10x      Step down             Gait Training                   With SPC  1 lap  W/SPC x 2 laps around gym                   Modalities             CP

## 2023-09-26 ENCOUNTER — OFFICE VISIT (OUTPATIENT)
Dept: PHYSICAL THERAPY | Facility: HOME HEALTHCARE | Age: 68
End: 2023-09-26
Payer: MEDICARE

## 2023-09-26 DIAGNOSIS — Z96.641 STATUS POST TOTAL REPLACEMENT OF RIGHT HIP: Primary | ICD-10-CM

## 2023-09-26 PROCEDURE — 97110 THERAPEUTIC EXERCISES: CPT

## 2023-09-26 NOTE — PROGRESS NOTES
Daily Note     Today's date: 2023  Patient name: Joie Wilkinson  : 1955  MRN: [id number]   Referring provider: Cabrera Ruth MD  Dx:   Encounter Diagnosis     ICD-10-CM    1. Status post total replacement of right hip  Z96.641           Start Time: 1300  Stop Time: 0186  Total time in clinic (min): 45 minutes    Subjective: Patient reports he continues with intermittent groin pain with adls but its getting better. Objective: See treatment diary below      Assessment: Tolerated treatment well. Ambulating with SPC. Weak quad however improving. Patient works hard throughout session. He was able to progress strengthening program with appropriate muscle fatigue. Patient demonstrated fatigue post treatment and exhibited good technique with therapeutic exercises. Patient would benefit from continued physical therapy to meet functional goals. Plan: Continue per plan of care. Precautions: R LORENA 2023.  Patient will be 5 weeks post op tomorrow     Re-eval Date: 2023      Manuals -     R hip PROM JA EC JA ML EC JK CD KL     Calf, hs stretch   JA ML EC JK CD KL                               Neuro Re-Ed          Balance as appropriate        QS 30x                                            Ther Ex          Nustep  L1  9' L1 10' L2 10' L2  10' L2  10' L2 x 10'  L2 x 10'      Glute set             Hip abd w/ sliding board HEP 1x10 1x15 15x x15 x15 15x manual     Heel slide flex  w/slide board HEP 1x10 1x15 15x x15 x15 15x manual     Hip abd        Yellow 10x     Hip add  3" 1x10 3" 1x15 3" 2x10 3"   2x10 3"  2x10 5" 20x 5" 20x     LAQ HEP 5" 1x10 5" x20 5"x20 5" x20 5"x20 5" 20x 5" 20x     HS curls             SLR HEP AAROM3" x10 AROM 1x10 AROM 10x  5x AROM  10x  5x AROM x 10 AROM 15x AROM 15x     Bridges  3"1x10 3" 1x10 3"x10 3" x10 3"x 10 3" 10x  20x     Clamshells             S/L hip abd             HR    2x10 2x10 2x10 30x Standing hip flex/abd/ext  Flex/abd  1x10 maria elena Flex/abd  1x20 R only Flex  Abd 20x ea  R only Flex   maria elena x20  Abd   maria elena 2x10  alt   Flex/abd x 20 ea  Flex/abd 20x ea Flex/abd 20x ea     Squat  1x5 x12 2x10 2x10 2x10  2x10 2x10     Leg press             Ther Activity             Step up  B 1x5 C 1x12 R C   R foot  10x C R foot  x10 C R foot   x10 C step R foot 10x C step R foot 10x     Step down             Gait Training                   With SPC  1 lap  W/SPC x 2 laps around gym reviewed around clinic                   Modalities             CP

## 2023-09-28 ENCOUNTER — APPOINTMENT (OUTPATIENT)
Dept: PHYSICAL THERAPY | Facility: HOME HEALTHCARE | Age: 68
End: 2023-09-28
Payer: MEDICARE

## 2023-09-28 ENCOUNTER — EVALUATION (OUTPATIENT)
Dept: PHYSICAL THERAPY | Facility: HOME HEALTHCARE | Age: 68
End: 2023-09-28
Payer: MEDICARE

## 2023-09-28 DIAGNOSIS — Z96.641 STATUS POST TOTAL REPLACEMENT OF RIGHT HIP: Primary | ICD-10-CM

## 2023-09-28 PROCEDURE — 97140 MANUAL THERAPY 1/> REGIONS: CPT

## 2023-09-28 PROCEDURE — 97110 THERAPEUTIC EXERCISES: CPT

## 2023-09-28 NOTE — LETTER
2023    Romulo Jorgensen MD  [address] [place] [address],[address] [id number]    Patient: Augusto Martinez   YOB: 1955   Date of Visit: 2023     Encounter Diagnosis     ICD-10-CM    1. 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 Gisel Leggett: Thank you for your recent referral of Augusto Martinez. Please review the attached evaluation summary from Adriel's recent visit. Please verify that you agree with the plan of care by signing the attached order. If you have any questions or concerns, please do not hesitate to call. I sincerely appreciate the opportunity to share in the care of one of your patients and hope to have another opportunity to work with you in the near future. Sincerely,    Francisco Javier Larson, PT      Referring Provider:      I certify that I have read the below Plan of Care and certify the need for these services furnished under this plan of treatment while under my care. Romulo Jorgensen MD  [address] [place] [address],[address] [place] [id number]  Via Fax: [phone]          PT Re-Evaluation     Today's date: 2023  Patient name: Augusto Martinez  : 1955  MRN: [id number]  Referring provider: Romulo Jorgensen MD  Dx:   Encounter Diagnosis     ICD-10-CM    1. 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 He has met his short-term goals for HEP, however, he is still progressing towards his long-term goals for pain, strength, ROM, and overall functional mobility. The pt is now independent for ambulation with a SPC, but does continue to ambulate with a mild gait dysfunction. The pt had a flare up of R knee pain today, so his session was modified accordingly.   The pt would benefit from continued PT in order to further improve in pain, strength, ROM, functional mobility, quality of life, and return to PLOF. Thank you. Impairments: abnormal gait, abnormal or restricted ROM, abnormal movement, activity intolerance, impaired physical strength, pain with function, weight-bearing intolerance and poor body mechanics  Understanding of Dx/Px/POC: good   Prognosis: good    Goals  STG: 3-4 weeks  Pt will be independent with HEP in order to continue to progress outside of PT treatment sessions. Met  Pt will improve in quality of life as demonstrated by worst pain levels of 5/10 or less. Pt will improve in functional mobility as demonstrated by ability to ambulate independently w/o an AD. Progressing  LT-8 weeks  Pt will improve in quality of life as demonstrated by worst pain levels of 2/10 or less. Pt will improve in functional mobility as demonstrated by strength levels of 4+/5 or greater. Pt will improve in functional mobility as demonstrated by R hip ROM WFL. Pt will improve in functional mobility as demonstrated by ability to perform full ADLs without any limitations due to pain, ROM deficits, or weakness. Plan  Patient would benefit from: skilled physical therapy  Planned modality interventions: cryotherapy  Planned therapy interventions: balance/weight bearing training, body mechanics training, flexibility, functional ROM exercises, gait training, home exercise program, manual therapy, neuromuscular re-education, patient education, postural training, strengthening, stretching, therapeutic activities and therapeutic exercise  Frequency: 2x week  Duration in weeks: 12  Plan of Care beginning date: 2023  Plan of Care expiration date: 2023  Treatment plan discussed with: patient        Subjective Evaluation    History of Present Illness  Mechanism of injury: Pt is presenting to OPPT s/p a R LORENA, which was performed on 2023.   He reports that the initial pain he had before surgery has improved, but that now he is mostly experiencing post-op pain rather than the pre-surgical pain. He reports that now he is much more comfortable in a recliner, but that he still has difficulty lifting his R leg due to weakness in his thigh muscle. He also reports that he has been having some swelling in his R LE. The pt is currently independent for ambulation with a RW and that he has been walking more each day, with the biggest improvement happening Saturday into . He reports that he has been doing some ankle pumps and quad sets for exercises. UPDATE 2023:  The pt reports that he has been doing well and getting around much better. He reports that his hip is a little sore today, possibly from the weather or the way he slept. He also reports that his R knee is giving him some trouble today. Patient Goals  Patient goals for therapy: decreased pain, increased motion, increased strength, independence with ADLs/IADLs and return to sport/leisure activities    Pain  Current pain ratin  At best pain ratin  At worst pain ratin  Relieving factors: rest, medications and ice (Reclining)    Social Support  Steps to enter house: yes  Stairs in house: yes     Treatments  Previous treatment: physical therapy        Objective     Observations     Right Hip  Positive for effusion. Neurological Testing     Sensation     Hip   Left Hip   Intact: light touch    Right Hip   Intact: light touch    Passive Range of Motion   Left Hip   Flexion: WFL  Abduction: WFL  External rotation (90/90): [facility]  Internal rotation (90/90):  WFL    Right Hip   Flexion: 90 degrees   Abduction: [facility]    Strength/Myotome Testing     Left Hip   Planes of Motion   Flexion: 4+  Abduction: 4+  Adduction: 4+    Right Hip   Planes of Motion   Flexion: 4-  Abduction: 3+  Adduction: 4    Left Knee   Flexion: 4+  Extension: 4+    Right Knee   Flexion: 4+  Extension: 4-    Ambulation     Ambulation: Level Surfaces   Ambulation with assistive device: independent    Additional Level Surfaces Ambulation Details  RW    Ambulation: Stairs   Ascend stairs: contact guard assist  Pattern: non-reciprocal  Railings: one rail  Descend stairs: contact guard assist  Pattern: non-reciprocal  Railings: one rail  Curbs: independent    Observational Gait   Gait: antalgic   Decreased walking speed, stride length and right stance time.        Flowsheet Rows    Flowsheet Row Most Recent Value   PT/OT G-Codes    Current Score 46   Projected Score 54            Precautions: R LORENA 08/23/2023    Re-eval Date: 09/28/2023          Manuals 8/28 8-30 9/5 9/7 9-11 9/18 9/20 9/26 9/28    R hip PROM JA EC JA ML EC JK IOANA KL JA    Calf, hs stretch   JA ML EC JK IOANA SCHOFIELD                              Neuro Re-Ed  8-30 9-11        Balance as appropriate        QS 30x                                            Ther Ex  8-30 9-11        Nustep  L1  9' L1 10' L2 10' L2  10' L2  10' L2 x 10'  L2 x 10'  L2 10'    Glute set             Hip abd w/ sliding board HEP 1x10 1x15 15x x15 x15 15x manual     Heel slide flex  w/slide board HEP 1x10 1x15 15x x15 x15 15x manual     Hip abd        Yellow 10x Yellow x20    Hip add  3" 1x10 3" 1x15 3" 2x10 3"   2x10 3"  2x10 5" 20x 5" 20x 5"x20    LAQ HEP 5" 1x10 5" x20 5"x20 5" x20 5"x20 5" 20x 5" 20x 5"x20    HS curls             SLR HEP AAROM3" x10 AROM 1x10 AROM 10x  5x AROM  10x  5x AROM x 10 AROM 15x AROM 15x AROM x15    Bridges  3"1x10 3" 1x10 3"x10 3" x10 3"x 10 3" 10x  20x x20    Clamshells             S/L hip abd             HR    2x10 2x10 2x10 30x      Standing hip flex/abd/ext  Flex/abd  1x10 maria elena Flex/abd  1x20 R only Flex  Abd 20x ea  R only Flex   maria elena x20  Abd   maria elena 2x10  alt   Flex/abd x 20 ea  Flex/abd 20x ea Flex/abd 20x ea Flex/abd x20 ea    Squat  1x5 x12 2x10 2x10 2x10  2x10 2x10 1x10     Leg press             Ther Activity             Step up  B 1x5 C 1x12 R C   R foot  10x C R foot  x10 C R foot x10 C step R foot 10x C step R foot 10x C step R foot 10x    Step down             Gait Training                   With SPC  1 lap  W/SPC x 2 laps around gym reviewed around clinic                   Modalities             CP

## 2023-09-28 NOTE — PROGRESS NOTES
PT Re-Evaluation     Today's date: 2023  Patient name: Kerry Suarez  : 1955  MRN: [id number]  Referring provider: Ishmael John MD  Dx:   Encounter Diagnosis     ICD-10-CM    1. Status post total replacement of right hip  Z96.641           Start Time: 2543  Stop Time: 8475  Total time in clinic (min): 45 minutes    Assessment  Assessment details: The pt has made improvements in pain, strength, ROM, and overall functional mobility since beginning PT. He has met his short-term goals for HEP, however, he is still progressing towards his long-term goals for pain, strength, ROM, and overall functional mobility. The pt is now independent for ambulation with a SPC, but does continue to ambulate with a mild gait dysfunction. The pt had a flare up of R knee pain today, so his session was modified accordingly. The pt would benefit from continued PT in order to further improve in pain, strength, ROM, functional mobility, quality of life, and return to PLOF. Thank you. Impairments: abnormal gait, abnormal or restricted ROM, abnormal movement, activity intolerance, impaired physical strength, pain with function, weight-bearing intolerance and poor body mechanics  Understanding of Dx/Px/POC: good   Prognosis: good    Goals  STG: 3-4 weeks  Pt will be independent with HEP in order to continue to progress outside of PT treatment sessions. Met  Pt will improve in quality of life as demonstrated by worst pain levels of 5/10 or less. Pt will improve in functional mobility as demonstrated by ability to ambulate independently w/o an AD. Progressing  LT-8 weeks  Pt will improve in quality of life as demonstrated by worst pain levels of 2/10 or less. Pt will improve in functional mobility as demonstrated by strength levels of 4+/5 or greater. Pt will improve in functional mobility as demonstrated by R hip ROM WFL.   Pt will improve in functional mobility as demonstrated by ability to perform full ADLs without any limitations due to pain, ROM deficits, or weakness. Plan  Patient would benefit from: skilled physical therapy  Planned modality interventions: cryotherapy  Planned therapy interventions: balance/weight bearing training, body mechanics training, flexibility, functional ROM exercises, gait training, home exercise program, manual therapy, neuromuscular re-education, patient education, postural training, strengthening, stretching, therapeutic activities and therapeutic exercise  Frequency: 2x week  Duration in weeks: 12  Plan of Care beginning date: 2023  Plan of Care expiration date: 2023  Treatment plan discussed with: patient        Subjective Evaluation    History of Present Illness  Mechanism of injury: Pt is presenting to OPPT s/p a R LORENA, which was performed on 2023. He reports that the initial pain he had before surgery has improved, but that now he is mostly experiencing post-op pain rather than the pre-surgical pain. He reports that now he is much more comfortable in a recliner, but that he still has difficulty lifting his R leg due to weakness in his thigh muscle. He also reports that he has been having some swelling in his R LE. The pt is currently independent for ambulation with a RW and that he has been walking more each day, with the biggest improvement happening Saturday into . He reports that he has been doing some ankle pumps and quad sets for exercises. UPDATE 2023:  The pt reports that he has been doing well and getting around much better. He reports that his hip is a little sore today, possibly from the weather or the way he slept. He also reports that his R knee is giving him some trouble today.   Patient Goals  Patient goals for therapy: decreased pain, increased motion, increased strength, independence with ADLs/IADLs and return to sport/leisure activities    Pain  Current pain ratin  At best pain ratin  At worst pain ratin  Relieving factors: rest, medications and ice (Reclining)    Social Support  Steps to enter house: yes  Stairs in house: yes     Treatments  Previous treatment: physical therapy        Objective     Observations     Right Hip  Positive for effusion. Neurological Testing     Sensation     Hip   Left Hip   Intact: light touch    Right Hip   Intact: light touch    Passive Range of Motion   Left Hip   Flexion: WFL  Abduction: WFL  [facility]90[facility]90[facility]  Internal rotation (90/90): WFL    Right Hip   Flexion: 90 degrees   Abduction: WFL    Strength/Myotome Testing     Left Hip   Planes of Motion   Flexion: 4+  Abduction: 4+  Adduction: 4+    Right Hip   Planes of Motion   Flexion: 4-  Abduction: 3+  Adduction: 4    Left Knee   Flexion: 4+  Extension: 4+    Right Knee   Flexion: 4+  Extension: 4-    Ambulation     Ambulation: Level Surfaces   Ambulation with assistive device: independent    Additional Level Surfaces Ambulation Details  RW    Ambulation: Stairs   Ascend stairs: contact guard assist  Pattern: non-reciprocal  Railings: one rail  Descend stairs: contact guard assist  Pattern: non-reciprocal  Railings: one rail  Curbs: independent    Observational Gait   Gait: antalgic   Decreased walking speed, stride length and right stance time.        Flowsheet Rows    Flowsheet Row Most Recent Value   PT/OT G-Codes    Current Score 46   Projected Score 54             Precautions: R LORENA 08/23/2023    Re-eval Date: 09/28/2023          Manuals 8/28 8-30 9/5 9/7 9-11 9/18 9/20 9/26 9/28    R hip PROM JA EC CHANDU SCHOFIELD    Calf, hs stretch   CHANDU SCHOFIELD                              Neuro Re-Ed  8-30 9-11        Balance as appropriate        QS 30x                                            Ther Ex  8-30 9-11        Nustep  L1  9' L1 10' L2 10' L2  10' L2  10' L2 x 10'  L2 x 10'  L2 10'    Glute set             Hip abd w/ sliding board HEP 1x10 1x15 15x x15 x15 15x manual     Heel slide flex  w/slide board HEP 1x10 1x15 15x x15 x15 15x manual     Hip abd        Yellow 10x Yellow x20    Hip add  3" 1x10 3" 1x15 3" 2x10 3"   2x10 3"  2x10 5" 20x 5" 20x 5"x20    LAQ HEP 5" 1x10 5" x20 5"x20 5" x20 5"x20 5" 20x 5" 20x 5"x20    HS curls             SLR HEP AAROM3" x10 AROM 1x10 AROM 10x  5x AROM  10x  5x AROM x 10 AROM 15x AROM 15x AROM x15    Bridges  3"1x10 3" 1x10 3"x10 3" x10 3"x 10 3" 10x  20x x20    Clamshells             S/L hip abd             HR    2x10 2x10 2x10 30x      Standing hip flex/abd/ext  Flex/abd  1x10 maria elena Flex/abd  1x20 R only Flex  Abd 20x ea  R only Flex   maria elena x20  Abd   maria elena 2x10  alt   Flex/abd x 20 ea  Flex/abd 20x ea Flex/abd 20x ea Flex/abd x20 ea    Squat  1x5 x12 2x10 2x10 2x10  2x10 2x10 1x10     Leg press             Ther Activity             Step up  B 1x5 C 1x12 R C   R foot  10x C R foot  x10 C R foot   x10 C step R foot 10x C step R foot 10x C step R foot 10x    Step down             Gait Training                   With SPC  1 lap  W/SPC x 2 laps around gym reviewed around clinic                   Modalities             CP

## 2023-10-03 ENCOUNTER — OFFICE VISIT (OUTPATIENT)
Dept: PHYSICAL THERAPY | Facility: HOME HEALTHCARE | Age: 68
End: 2023-10-03
Payer: MEDICARE

## 2023-10-03 DIAGNOSIS — Z96.641 STATUS POST TOTAL REPLACEMENT OF RIGHT HIP: Primary | ICD-10-CM

## 2023-10-03 PROCEDURE — 97140 MANUAL THERAPY 1/> REGIONS: CPT

## 2023-10-03 PROCEDURE — 97110 THERAPEUTIC EXERCISES: CPT

## 2023-10-03 NOTE — PROGRESS NOTES
Daily Note     Today's date: 10/3/2023  Patient name: Renae Pedraza  : 1955  MRN: [id number]  Referring provider: Anyi Cueto MD  Dx: No diagnosis found. Start Time: 1300          Subjective: I am doing well and don't always use my SPC. I still have trouble on steps. Objective: See treatment diary below    Assessment: Pt progressing slowly and consistently with strength and pain free A/PROM. Pt reports consistency with HEP as daily ability allows. . Patient would benefit from continued PT    Plan: Continue per plan of care.       Precautions: R LORENA 2023    Re-eval Date: 2023          Manuals - 10-3   R hip PROM JA EC JA ML EC JK CD KL JA EC   Calf, hs stretch   JA ML EC JK CD KL JA EC                             Neuro Re-Ed  -     10-3   Balance as appropriate        QS 30x                                            Ther Ex       10-3   Nustep  L1  9' L1 10' L2 10' L2  10' L2  10' L2 x 10'  L2 x 10'  L2 10' L2  10'   Glute set             Hip abd w/ sliding board HEP 1x10 1x15 15x x15 x15 15x manual     Heel slide flex  w/slide board HEP 1x10 1x15 15x x15 x15 15x manual     Hip abd        Yellow 10x Yellow x20 Yellow  x20   Hip add  3" 1x10 3" 1x15 3" 2x10 3"   2x10 3"  2x10 5" 20x 5" 20x 5"x20 5" x20   LAQ HEP 5" 1x10 5" x20 5"x20 5" x20 5"x20 5" 20x 5" 20x 5"x20 5" x20   HS curls             SLR HEP AAROM3" x10 AROM 1x10 AROM 10x  5x AROM  10x  5x AROM x 10 AROM 15x AROM 15x AROM x15 AROM  x20   Bridges  3"1x10 3" 1x10 3"x10 3" x10 3"x 10 3" 10x  20x x20 x20   Clamshells             S/L hip abd             HR    2x10 2x10 2x10 30x      Standing hip flex/abd/ext  Flex/abd  1x10 maria elena Flex/abd  1x20 R only Flex  Abd 20x ea  R only Flex   maria elena x20  Abd   maria elena 2x10  alt   Flex/abd x 20 ea  Flex/abd 20x ea Flex/abd 20x ea Flex/abd x20 ea Flex/abd  x15 ea maria elena   Squat  1x5 x12 2x10 2x10 2x10  2x10 2x10 1x10  1x10   Leg press Ther Activity          10-3   Step up  B 1x5 C 1x12 R C   R foot  10x C R foot  x10 C R foot   x10 C step R foot 10x C step R foot 10x C step R foot 10x C step  R foot  x10   Step down             Gait Training                   With SPC  1 lap  W/SPC x 2 laps around gym reviewed around clinic                   Modalities             CP

## 2023-10-05 ENCOUNTER — OFFICE VISIT (OUTPATIENT)
Dept: PHYSICAL THERAPY | Facility: HOME HEALTHCARE | Age: 68
End: 2023-10-05
Payer: MEDICARE

## 2023-10-05 ENCOUNTER — APPOINTMENT (OUTPATIENT)
Dept: PHYSICAL THERAPY | Facility: HOME HEALTHCARE | Age: 68
End: 2023-10-05
Payer: MEDICARE

## 2023-10-05 DIAGNOSIS — Z96.641 STATUS POST TOTAL REPLACEMENT OF RIGHT HIP: Primary | ICD-10-CM

## 2023-10-05 PROCEDURE — 97110 THERAPEUTIC EXERCISES: CPT

## 2023-10-05 PROCEDURE — 97140 MANUAL THERAPY 1/> REGIONS: CPT

## 2023-10-05 NOTE — PROGRESS NOTES
Daily Note     Today's date: 10/5/2023  Patient name: Loise Skiff  : 1955  MRN: [id number]  Referring provider: Nancy Allen MD  Dx: No diagnosis found. Subjective: I feel good and hope to go to the [employer] game next week. Objective: See treatment diary below    Assessment: Pt progressing slowly and consistently with strength and pain free A/PROM. Pt reports consistency with HEP as daily ability allows. Reviewed self stretch with pt self assisting to assure proper form and stressed consistency with self stretching to improve pain free flexibility. Patient would benefit from continued PT    Plan: Continue per plan of care.       Precautions: R LORENA 2023    Re-eval Date: 2023          Manuals 10-5 8-30 9/5 9/7 9-11 9/18 9/20 9/26 9/28 10-3   R hip PROM EC EC JA ML EC JK CD KL JA EC   Calf, hs stretch EC  JA ML EC JK CD KL JA EC                             Neuro Re-Ed 10--     10-3   Balance as appropriate        QS 30x                                            Ther Ex 10--     10-3   Nustep L2  10' L1  9' L1 10' L2 10' L2  10' L2  10' L2 x 10'  L2 x 10'  L2 10' L2  10'   Glute set             Hip abd w/ sliding board  1x10 1x15 15x x15 x15 15x manual     Heel slide flex  w/slide board  1x10 1x15 15x x15 x15 15x manual     Hip abd Yellow  x20       Yellow 10x Yellow x20 Yellow  x20   Hip add 5' x20 3" 1x10 3" 1x15 3" 2x10 3"   2x10 3"  2x10 5" 20x 5" 20x 5"x20 5" x20   LAQ 5" x20 5" 1x10 5" x20 5"x20 5" x20 5"x20 5" 20x 5" 20x 5"x20 5" x20   HS curls             SLR AROM  x20 AAROM3" x10 AROM 1x10 AROM 10x  5x AROM  10x  5x AROM x 10 AROM 15x AROM 15x AROM x15 AROM  x20   Bridges x20 3"1x10 3" 1x10 3"x10 3" x10 3"x 10 3" 10x  20x x20 x20   Clamshells             S/L hip abd             HR    2x10 2x10 2x10 30x      Standing hip flex/abd/ext Flex/abd  x15 ea maria elena Flex/abd  1x10 maria elena Flex/abd  1x20 R only Flex  Abd 20x ea  R only Flex   maria elena x20  Abd   maria elena 2x10  alt   Flex/abd x 20 ea  Flex/abd 20x ea Flex/abd 20x ea Flex/abd x20 ea Flex/abd  x15 ea maria elena   Squat 1x10 1x5 x12 2x10 2x10 2x10  2x10 2x10 1x10  1x10   Leg press             Ther Activity          10-3   Step up C step  R foot  x15 B 1x5 C 1x12 R C   R foot  10x C R foot  x10 C R foot   x10 C step R foot 10x C step R foot 10x C step R foot 10x C step  R foot  x10   Step down             Gait Training                   With SPC  1 lap  W/SPC x 2 laps around gym reviewed around clinic                   Modalities             CP

## 2023-10-09 ENCOUNTER — OFFICE VISIT (OUTPATIENT)
Dept: PHYSICAL THERAPY | Facility: HOME HEALTHCARE | Age: 68
End: 2023-10-09
Payer: MEDICARE

## 2023-10-09 DIAGNOSIS — Z96.641 STATUS POST TOTAL REPLACEMENT OF RIGHT HIP: Primary | ICD-10-CM

## 2023-10-09 PROCEDURE — 97110 THERAPEUTIC EXERCISES: CPT

## 2023-10-09 PROCEDURE — 97530 THERAPEUTIC ACTIVITIES: CPT

## 2023-10-09 PROCEDURE — 97140 MANUAL THERAPY 1/> REGIONS: CPT

## 2023-10-09 NOTE — PROGRESS NOTES
Daily Note     Today's date: 10/9/2023  Patient name: Augusto Martinez  : 1955  MRN: [id number]  Referring provider: Romulo Jorgensen MD  Dx:   Encounter Diagnosis     ICD-10-CM    1. Status post total replacement of right hip  Z96.641           Start Time: 1300  Stop Time: 3961  Total time in clinic (min): 57 minutes    Subjective: Pt reports that his hip is feeling good and that he does not have any pain. Objective: See treatment diary below      Assessment: Pt demonstrated good tolerance to treatment session. The pt does continue to have some R LE strength deficits, but was able to increase in resistance today for LE strengthening. The pt demonstrated good hip AROM, but does continue to have mild hamstring and gastroc tightness. The pt would benefit from continued PT. Plan: Continue per plan of care.       Precautions: R LORENA 2023    Re-eval Date: 2023          Manuals 10-5 10/9  9/7 9- 10-3   R hip PROM EC JA  ML EC JK CD KL JA EC   Calf, hs stretch EC JA  ML EC JK CD KL JA EC                             Neuro Re-Ed 10-5    9-     10-3   Balance as appropriate        QS 30x                                            Ther Ex 10-5    9-11     10-3   Nustep L2  10' L3 10'  L2 10' L2  10' L2  10' L2 x 10'  L2 x 10'  L2 10' L2  10'   Glute set             Hip abd w/ sliding board    15x x15 x15 15x manual     Heel slide flex  w/slide board    15x x15 x15 15x manual     Hip abd Yellow  x20 Machine below      Yellow 10x Yellow x20 Yellow  x20   Hip add 5' x20 5"x25  3" 2x10 3"   2x10 3"  2x10 5" 20x 5" 20x 5"x20 5" x20   LAQ 5" x20 3# 5" x20 maria elena  5"x20 5" x20 5"x20 5" 20x 5" 20x 5"x20 5" x20   HS curls             SLR AROM  x20 AROM x20  AROM 10x  5x AROM  10x  5x AROM x 10 AROM 15x AROM 15x AROM x15 AROM  x20   Bridges x20 x20  3"x10 3" x10 3"x 10 3" 10x  20x x20 x20   Clamshells             S/L hip abd             HR    2x10 2x10 2x10 30x      Standing hip flex/abd/ext Flex/abd  x15 ea maria elena Flex/abd  x20 ea maria elena  Flex  Abd 20x ea  R only Flex   maria elena x20  Abd   maria elena 2x10  alt   Flex/abd x 20 ea  Flex/abd 20x ea Flex/abd 20x ea Flex/abd x20 ea Flex/abd  x15 ea maria elena   Squat 1x10 x15  2x10 2x10 2x10  2x10 2x10 1x10  1x10   Leg press             LF hip abd  35# 2x10                        Ther Activity          10-3   Step up C step  R foot  x15 C step R foot x20  C   R foot  10x C R foot  x10 C R foot   x10 C step R foot 10x C step R foot 10x C step R foot 10x C step  R foot  x10   Step down             Gait Training                   With SPC  1 lap  W/SPC x 2 laps around gym reviewed around clinic                   Modalities             CP

## 2023-10-11 ENCOUNTER — APPOINTMENT (OUTPATIENT)
Dept: PHYSICAL THERAPY | Facility: HOME HEALTHCARE | Age: 68
End: 2023-10-11
Payer: MEDICARE

## 2023-10-12 DIAGNOSIS — I10 ESSENTIAL HYPERTENSION: ICD-10-CM

## 2023-10-12 RX ORDER — LISINOPRIL 5 MG/1
5 TABLET ORAL DAILY
Qty: 90 TABLET | Refills: 1 | Status: SHIPPED | OUTPATIENT
Start: 2023-10-12

## 2023-10-16 ENCOUNTER — OFFICE VISIT (OUTPATIENT)
Dept: PHYSICAL THERAPY | Facility: HOME HEALTHCARE | Age: 68
End: 2023-10-16
Payer: MEDICARE

## 2023-10-16 DIAGNOSIS — Z96.641 STATUS POST TOTAL REPLACEMENT OF RIGHT HIP: Primary | ICD-10-CM

## 2023-10-16 PROCEDURE — 97110 THERAPEUTIC EXERCISES: CPT

## 2023-10-16 PROCEDURE — 97140 MANUAL THERAPY 1/> REGIONS: CPT

## 2023-10-16 NOTE — PROGRESS NOTES
Daily Note     Today's date: 10/16/2023  Patient name: Reed Dobson  : 1955  MRN: [id number]  Referring provider: Sarah Larios MD  Dx:   Encounter Diagnosis     ICD-10-CM    1. Status post total replacement of right hip  Z96.641                  Subjective: Pt reports no pain R hip. Objective: See treatment diary below      Assessment: Tolerated treatment well. Pt with no c/o pain R hip t/o session or at end of session. Strength deficits still evident R LE. Mild HS and gastroc tightness noted. Patient would benefit from continued PT      Plan: Continue per plan of care.       Precautions: R LORENA 2023    Re-eval Date: 2023          Manuals 10-5 10/9 10/16 9/7 9- 10-3   R hip PROM EC JA JK ML EC JK CD KL JA EC   Calf, hs stretch EC JA JK ML EC JK CD KL JA EC                             Neuro Re-Ed 10-5    9-     10-3   Balance as appropriate        QS 30x                                            Ther Ex 10-5    9-     10-3   Nustep L2  10' L3 10' L3 10' L2 10' L2  10' L2  10' L2 x 10'  L2 x 10'  L2 10' L2  10'   Glute set             Hip abd w/ sliding board    15x x15 x15 15x manual     Heel slide flex  w/slide board    15x x15 x15 15x manual     Hip abd Yellow  x20 Machine below      Yellow 10x Yellow x20 Yellow  x20   Hip add 5' x20 5"x25 5"x 25 3" 2x10 3"   2x10 3"  2x10 5" 20x 5" 20x 5"x20 5" x20   LAQ 5" x20 3# 5" x20 maria elena 3# 5"  X 20 Maria Elena 5"x20 5" x20 5"x20 5" 20x 5" 20x 5"x20 5" x20   HS curls             SLR AROM  x20 AROM x20 AROM  X 20 AROM 10x  5x AROM  10x  5x AROM x 10 AROM 15x AROM 15x AROM x15 AROM  x20   Bridges x20 x20 X 20 3"x10 3" x10 3"x 10 3" 10x  20x x20 x20   Clamshells             S/L hip abd             HR    2x10 2x10 2x10 30x      Standing hip flex/abd/ext Flex/abd  x15 ea maria elena Flex/abd  x20 ea maria elena Flex/abd  X 20 ea Maria Elena Flex  Abd 20x ea  R only Flex   maria elena x20  Abd   maria elena 2x10  alt   Flex/abd x 20 ea  Flex/abd 20x ea Flex/abd 20x ea Flex/abd x20 ea Flex/abd  x15 ea maria elena   Squat 1x10 x15 X 15  2x10 2x10 2x10  2x10 2x10 1x10  1x10   Leg press             LF hip abd  35# 2x10 35#   2x10                        Ther Activity          10-3   Step up C step  R foot  x15 C step R foot x20 C step  R foot   x20 C   R foot  10x C R foot  x10 C R foot   x10 C step R foot 10x C step R foot 10x C step R foot 10x C step  R foot  x10   Step down             Gait Training                   With SPC  1 lap  W/SPC x 2 laps around gym reviewed around clinic                   Modalities             CP

## 2023-10-18 ENCOUNTER — OFFICE VISIT (OUTPATIENT)
Dept: PHYSICAL THERAPY | Facility: HOME HEALTHCARE | Age: 68
End: 2023-10-18
Payer: MEDICARE

## 2023-10-18 DIAGNOSIS — Z96.641 STATUS POST TOTAL REPLACEMENT OF RIGHT HIP: Primary | ICD-10-CM

## 2023-10-18 NOTE — PROGRESS NOTES
PT Discharge    Today's date: 10/18/2023  Patient name: Salvador Tobar  : 1955  MRN: [id number]  Referring provider: Bora Whitehead MD  Dx:   Encounter Diagnosis     ICD-10-CM    1. Status post total replacement of right hip  Z96.641           Start Time:   Stop Time: 1250  Total time in clinic (min): 9 minutes    Assessment  Assessment details: The pt has made significant improvements in pain, strength, ROM, and functional mobility, and he has either met or is progressing towards all of his goals. He has demonstrated independence with his HEP and he is confident in his ability to adhere to his HEP and further progress on his own. The pt will be D/C to his HEP at this time. Understanding of Dx/Px/POC: good   Prognosis: good    Goals  STG: 3-4 weeks  Pt will be independent with HEP in order to continue to progress outside of PT treatment sessions. Met  Pt will improve in quality of life as demonstrated by worst pain levels of 5/10 or less. Met  Pt will improve in functional mobility as demonstrated by ability to ambulate independently w/o an AD. Met  LT-8 weeks  Pt will improve in quality of life as demonstrated by worst pain levels of 2/10 or less. Met  Pt will improve in functional mobility as demonstrated by strength levels of 4+/5 or greater. Met  Pt will improve in functional mobility as demonstrated by R hip ROM WFL. Met  Pt will improve in functional mobility as demonstrated by ability to perform full ADLs without any limitations due to pain, ROM deficits, or weakness. Met      Plan  Plan details: D/C to HEP  Treatment plan discussed with: patient        Subjective Evaluation    History of Present Illness  Mechanism of injury: Pt is presenting to OPPT s/p a R LORENA, which was performed on 2023. He reports that the initial pain he had before surgery has improved, but that now he is mostly experiencing post-op pain rather than the pre-surgical pain.   He reports that now he is much more comfortable in a recliner, but that he still has difficulty lifting his R leg due to weakness in his thigh muscle. He also reports that he has been having some swelling in his R LE. The pt is currently independent for ambulation with a RW and that he has been walking more each day, with the biggest improvement happening Saturday into . He reports that he has been doing some ankle pumps and quad sets for exercises. UPDATE 2023:  The pt reports that he has been doing well and getting around much better. He reports that his hip is a little sore today, possibly from the weather or the way he slept. He also reports that his R knee is giving him some trouble today. UPDATE 10/18/2023:  The pt reports that he is doing really well and that he is mostly independent for ambulation without an AD. The pt reports good compliance with his HEP and feels he is ready for D/C to his HEP. Patient Goals  Patient goals for therapy: decreased pain, increased motion, increased strength, independence with ADLs/IADLs and return to sport/leisure activities    Pain  Current pain ratin  At best pain ratin  At worst pain ratin  Relieving factors: rest, medications and ice (Reclining)    Social Support  Steps to enter house: yes  Stairs in house: yes     Treatments  Previous treatment: physical therapy        Objective     Observations     Right Hip  Positive for effusion. Neurological Testing     Sensation     Hip   Left Hip   Intact: light touch    Right Hip   Intact: light touch    Passive Range of Motion   Left Hip   Flexion: WFL  Abduction: WFL  External rotation (90/90): [facility]  Internal rotation (90/90):  WFL    Right Hip   Flexion: 90 degrees   Abduction: [facility]    Strength/Myotome Testing     Left Hip   Planes of Motion   Flexion: 4+  Abduction: 5  Adduction: 4+    Right Hip   Planes of Motion   Flexion: 4+  Abduction: 4+  Adduction: 4+    Left Knee   Flexion: 4+  Extension: 4+    Right Knee   Flexion: 4+  Extension: 4+    Ambulation     Ambulation: Level Surfaces   Ambulation with assistive device: independent    Additional Level Surfaces Ambulation Details  RW    Ambulation: Stairs   Ascend stairs: contact guard assist  Pattern: non-reciprocal  Railings: one rail  Descend stairs: contact guard assist  Pattern: non-reciprocal  Railings: one rail  Curbs: independent    Observational Gait   Gait: antalgic   Decreased walking speed, stride length and right stance time.        Flowsheet Rows      Flowsheet Row Most Recent Value   PT/OT G-Codes    Current Score 70   Projected Score 54                 Precautions: R LORENA 08/23/2023    Re-eval Date: 09/28/2023

## 2023-12-07 ENCOUNTER — OFFICE VISIT (OUTPATIENT)
Dept: NEUROLOGY | Facility: CLINIC | Age: 68
End: 2023-12-07
Payer: MEDICARE

## 2023-12-07 VITALS
DIASTOLIC BLOOD PRESSURE: 63 MMHG | SYSTOLIC BLOOD PRESSURE: 116 MMHG | TEMPERATURE: 97.3 F | HEART RATE: 84 BPM | BODY MASS INDEX: 29.33 KG/M2 | WEIGHT: 221.3 LBS | HEIGHT: 73 IN

## 2023-12-07 DIAGNOSIS — G40.109 FOCAL EPILEPSY (HCC): Primary | ICD-10-CM

## 2023-12-07 DIAGNOSIS — R56.9 SEIZURE (HCC): ICD-10-CM

## 2023-12-07 PROCEDURE — 99213 OFFICE O/P EST LOW 20 MIN: CPT | Performed by: PSYCHIATRY & NEUROLOGY

## 2023-12-07 RX ORDER — LEVETIRACETAM 500 MG/1
1000 TABLET ORAL 2 TIMES DAILY
Qty: 120 TABLET | Refills: 11 | Status: SHIPPED | OUTPATIENT
Start: 2023-12-07

## 2023-12-07 NOTE — PROGRESS NOTES
Patient ID: Destiney Lombardi is a 80 y.o. male with hypertension, hyperlipidemia, and localization related epilepsy, who is returning to Neurology office for follow up of his seizures. Assessment/Plan:    Focal epilepsy ([address])  He continues to be seizure free now that he has been consistent with his Levetiracetam. His most recent seizure occurred in the setting of low blood levels from missing at least 2 doses of medications. Fortunately, he has been doing well since. -- he will continue Levetiracetam 1000 mg twice a day. If he would have additional seizures despite compliance, he would benefit from having his Levetiracetam increased. He will Return in about 6 months (around 6/7/2024). Subjective:    HPI  Current seizure medications:  1. Levetiracetam 1000 mg twice a day  Other medications as per Epic. Since his last visit, he has not had any additional seizures since his most recent in April 2023. He has been tolerating Levetiracetam well and has been compliant without missing any doses. He recently had a hip replacement and his mobility is much better. Prior Seizure Medications: none       Objective:    Blood pressure 116/63, pulse 84, temperature (!) 97.3 °F (36.3 °C), temperature source Temporal, height 6' 1" (1.854 m), weight 100 kg (221 lb 4.8 oz). Physical Exam    Neurological Exam      ROS:    Review of Systems   Constitutional:  Negative for appetite change, fatigue and fever. HENT: Negative. Negative for hearing loss, tinnitus, trouble swallowing and voice change. Eyes: Negative. Negative for photophobia, pain and visual disturbance. Respiratory: Negative. Negative for shortness of breath. Cardiovascular: Negative. Negative for palpitations. Gastrointestinal: Negative. Negative for nausea and vomiting. Endocrine: Negative. Negative for cold intolerance. Genitourinary: Negative. Negative for dysuria, frequency and urgency.    Musculoskeletal: Negative for back pain, gait problem, myalgias and neck pain. Skin: Negative. Negative for rash. Allergic/Immunologic: Negative. Neurological: Negative. Negative for dizziness, tremors, seizures, syncope, facial asymmetry, speech difficulty, weakness, light-headedness, numbness and headaches. Hematological: Negative. Does not bruise/bleed easily. Psychiatric/Behavioral: Negative. Negative for confusion, hallucinations and sleep disturbance. All other systems reviewed and are negative. I personally reviewed the ROS that was entered by the medical assistant    Voice recognition software was used in the generation of this note. There may be unintentional errors including grammatical errors, spelling errors, or pronoun errors.

## 2023-12-08 NOTE — ASSESSMENT & PLAN NOTE
He continues to be seizure free now that he has been consistent with his Levetiracetam. His most recent seizure occurred in the setting of low blood levels from missing at least 2 doses of medications. Fortunately, he has been doing well since. -- he will continue Levetiracetam 1000 mg twice a day. If he would have additional seizures despite compliance, he would benefit from having his Levetiracetam increased.

## 2023-12-12 ENCOUNTER — OFFICE VISIT (OUTPATIENT)
Dept: URGENT CARE | Facility: MEDICAL CENTER | Age: 68
End: 2023-12-12
Payer: MEDICARE

## 2023-12-12 VITALS
HEIGHT: 73 IN | TEMPERATURE: 98.6 F | SYSTOLIC BLOOD PRESSURE: 149 MMHG | BODY MASS INDEX: 29.16 KG/M2 | HEART RATE: 79 BPM | RESPIRATION RATE: 18 BRPM | OXYGEN SATURATION: 98 % | DIASTOLIC BLOOD PRESSURE: 95 MMHG | WEIGHT: 220 LBS

## 2023-12-12 DIAGNOSIS — R09.81 SINUS CONGESTION: Primary | ICD-10-CM

## 2023-12-12 LAB
SARS-COV-2 AG UPPER RESP QL IA: NEGATIVE
VALID CONTROL: NORMAL

## 2023-12-12 PROCEDURE — 87811 SARS-COV-2 COVID19 W/OPTIC: CPT

## 2023-12-12 PROCEDURE — 99212 OFFICE O/P EST SF 10 MIN: CPT

## 2023-12-12 PROCEDURE — G0463 HOSPITAL OUTPT CLINIC VISIT: HCPCS

## 2023-12-12 PROCEDURE — 87636 SARSCOV2 & INF A&B AMP PRB: CPT

## 2023-12-12 NOTE — PROGRESS NOTES
[facility]        NAME: Olga Talley is a 80 y.o. male  : 1955    MRN: [id number]  DATE: 2023  TIME: 2:37 PM    Assessment and Plan   Sinus congestion [R09.81]  1. Sinus congestion  Poct Covid 19 Rapid Antigen Test    Covid/Flu-Office Collect            Patient Instructions       Follow up with PCP in 3-5 days. Proceed to  ER if symptoms worsen. Chief Complaint     Chief Complaint   Patient presents with   • Cold Like Symptoms     Cough, runny nose, nasal congestion, body aches x 2 days          History of Present Illness       Patient started for a few days with sinus congestion, runny nose, body aches. Sister recently had COVID - he was not around her but were recently around other family members so was concerned he may have [place]. He felt warm last night- he did not take him temperature. He has not taken anything for his symptoms other than when he takes tylenol for his headache/pain. Last dose of Tylenol was about 7/8 AM.    Patient has a home COVID test however did not use it he felt more comfortable to be evaluated. He did not reach out to his primary care provider. Provided reassurance and instructions on how to use his home COVID test should he continue with symptoms. Discussed the potential that there was a false negative given that he has just started with symptoms about 2 days ago. We have not been seeing positive point-of-care testing results until day 3 of symptoms. Review of Systems   Review of Systems   Constitutional:  Negative for appetite change, chills, fatigue and fever. Felt warm but did not take his temperature. HENT:  Positive for congestion, postnasal drip, rhinorrhea, sneezing, sore throat and voice change. Negative for ear pain and trouble swallowing. Respiratory:  Negative for cough and shortness of breath. Cardiovascular:  Negative for chest pain and palpitations. Gastrointestinal:  Positive for diarrhea.  Negative for abdominal pain, constipation, nausea and vomiting. Musculoskeletal:  Negative for arthralgias, back pain and myalgias. Skin:  Negative for color change and rash. Neurological:  Negative for dizziness, light-headedness and headaches. All other systems reviewed and are negative.         Current Medications       Current Outpatient Medications:   •  acetaminophen (TYLENOL) 500 mg tablet, Take 500 mg by mouth every 6 (six) hours as needed for mild pain, Disp: , Rfl:   •  levETIRAcetam (KEPPRA) 500 mg tablet, Take 2 tablets (1,000 mg total) by mouth 2 (two) times a day, Disp: 120 tablet, Rfl: 11  •  lisinopril (ZESTRIL) 5 mg tablet, Take 1 tablet (5 mg total) by mouth daily, Disp: 90 tablet, Rfl: 1  •  mupirocin (BACTROBAN) 2 % ointment, Apply topically 2 (two) times a day, Disp: , Rfl:   •  mupirocin (BACTROBAN) 2 % ointment, Apply topically 3 (three) times a day, Disp: 22 g, Rfl: 0    Current Allergies     Allergies as of 12/12/2023 - Reviewed 12/12/2023   Allergen Reaction Noted   • Naproxen Other (See Comments) 04/18/2017   • Pollen extract  06/14/2019            The following portions of the patient's history were reviewed and updated as appropriate: allergies, current medications, past family history, past medical history, past social history, past surgical history and problem list.     Past Medical History:   Diagnosis Date   • Cellulitis of leg     LAST ASSESSED: 7/8/14   • Contusion, hip     LAST ASSESSED: 7/8/14   • Hypertension     not taking medications   • Laceration of leg     LAST ASSESSED: 7/8/14   • Pelvic hematoma, male     RESOLVED: 5/11/17   • Rheumatoid arthritis(714.0)    • Seizures (HCC)        Past Surgical History:   Procedure Laterality Date   • ARTHROSCOPY KNEE Right    • COLONOSCOPY     • KNEE SURGERY     • [facility] ARTHROCENTESIS ASPIR&/INJ MAJOR JT/BURSA W/O US Right 2/16/2023    Procedure: INJECTION JOINT HIP;  Surgeon: Sofi Romo MD;  Location: [facility];  Service: Pain Management        Family History   Problem Relation Age of Onset   • Cancer Father    • Heart disease Maternal Grandmother         CARDIAC DISORDER   • Parkinsonism Paternal Grandfather          Medications have been verified. Objective   /95   Pulse 79   Temp 98.6 °F (37 °C)   Resp 18   Ht 6' 1" (1.854 m)   Wt 99.8 kg (220 lb)   SpO2 98%   BMI 29.03 kg/m²        Physical Exam     Physical Exam  Vitals and nursing note reviewed. Constitutional:       General: He is awake. He is not in acute distress. Appearance: Normal appearance. He is well-developed, well-groomed and normal weight. He is not ill-appearing. HENT:      Head: Normocephalic and atraumatic. Right Ear: Tympanic membrane, ear canal and external ear normal.      Left Ear: Tympanic membrane, ear canal and external ear normal.      Nose: Congestion and rhinorrhea present. Rhinorrhea is clear. Mouth/Throat:      Lips: Pink. Mouth: Mucous membranes are moist.      Pharynx: Oropharynx is clear. Uvula midline. Posterior oropharyngeal erythema present. No pharyngeal swelling, oropharyngeal exudate or uvula swelling. Tonsils: No tonsillar exudate or tonsillar abscesses. 0 on the right. 0 on the left. Eyes:      Extraocular Movements: Extraocular movements intact. Conjunctiva/sclera: Conjunctivae normal.      Pupils: Pupils are equal, round, and reactive to light. Cardiovascular:      Rate and Rhythm: Normal rate and regular rhythm. Pulses: Normal pulses. Heart sounds: Normal heart sounds, S1 normal and S2 normal. No murmur heard. Pulmonary:      Effort: Pulmonary effort is normal.      Breath sounds: Normal breath sounds. Abdominal:      General: Abdomen is flat. Bowel sounds are normal.      Palpations: Abdomen is soft. Musculoskeletal:         General: Normal range of motion. Cervical back: Full passive range of motion without pain, normal range of motion and neck supple. Lymphadenopathy:      Cervical: No cervical adenopathy. Skin:     General: Skin is warm and dry. Capillary Refill: Capillary refill takes less than 2 seconds. Neurological:      General: No focal deficit present. Mental Status: He is alert and oriented to person, place, and time. Psychiatric:         Mood and Affect: Mood normal.         Behavior: Behavior normal. Behavior is cooperative.

## 2023-12-13 LAB
FLUAV RNA RESP QL NAA+PROBE: NEGATIVE
FLUBV RNA RESP QL NAA+PROBE: NEGATIVE
SARS-COV-2 RNA RESP QL NAA+PROBE: NEGATIVE

## 2023-12-14 ENCOUNTER — TELEPHONE (OUTPATIENT)
Dept: FAMILY MEDICINE CLINIC | Facility: CLINIC | Age: 68
End: 2023-12-14

## 2023-12-14 DIAGNOSIS — J06.9 UPPER RESPIRATORY TRACT INFECTION, UNSPECIFIED TYPE: Primary | ICD-10-CM

## 2023-12-14 RX ORDER — AZITHROMYCIN 250 MG/1
TABLET, FILM COATED ORAL
Qty: 6 TABLET | Refills: 0 | Status: SHIPPED | OUTPATIENT
Start: 2023-12-14 | End: 2023-12-18

## 2023-12-14 NOTE — TELEPHONE ENCOUNTER
Rx put in for a Z-Erick. Push fluids. Recommend getting generic Mucinex over-the-counter. Make appointment if symptoms continue or increase.

## 2023-12-14 NOTE — TELEPHONE ENCOUNTER
Went to urgent care yesterday, he was negative for covid, very congested today and is requesting an antibiotic
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
## 2024-04-12 DIAGNOSIS — I10 ESSENTIAL HYPERTENSION: ICD-10-CM

## 2024-04-12 RX ORDER — LISINOPRIL 5 MG/1
5 TABLET ORAL DAILY
Qty: 90 TABLET | Refills: 0 | Status: SHIPPED | OUTPATIENT
Start: 2024-04-12

## 2024-06-05 ENCOUNTER — OFFICE VISIT (OUTPATIENT)
Dept: NEUROLOGY | Facility: CLINIC | Age: 69
End: 2024-06-05
Payer: MEDICARE

## 2024-06-05 VITALS
BODY MASS INDEX: 29.46 KG/M2 | TEMPERATURE: 99 F | DIASTOLIC BLOOD PRESSURE: 84 MMHG | HEIGHT: 73 IN | SYSTOLIC BLOOD PRESSURE: 144 MMHG | WEIGHT: 222.3 LBS | HEART RATE: 65 BPM

## 2024-06-05 DIAGNOSIS — G40.109 FOCAL EPILEPSY (HCC): Primary | ICD-10-CM

## 2024-06-05 PROCEDURE — 99213 OFFICE O/P EST LOW 20 MIN: CPT | Performed by: PSYCHIATRY & NEUROLOGY

## 2024-06-05 PROCEDURE — G2211 COMPLEX E/M VISIT ADD ON: HCPCS | Performed by: PSYCHIATRY & NEUROLOGY

## 2024-06-05 RX ORDER — LEVETIRACETAM 500 MG/1
1000 TABLET ORAL 2 TIMES DAILY
Qty: 120 TABLET | Refills: 11 | Status: SHIPPED | OUTPATIENT
Start: 2024-06-05

## 2024-06-05 NOTE — PATIENT INSTRUCTIONS
-- continue to take Levetiracetam 1000 mg twice a day     -- Please call our office if any problems.

## 2024-06-05 NOTE — PROGRESS NOTES
"Neurology Ambulatory Visit  Name: Adriel Gonsalez       : 1955       MRN: [id number]   Encounter Provider: Gen Zavala MD   Encounter Date: 2024  Encounter department: NEUROLOGY [facility]    Assessment and Plan  1. Focal epilepsy (HCC)  Assessment & Plan:  He has been seizure-free since maintaining compliance with levetiracetam.  He is tolerating this well without any side effects as well.  I stressed the importance of continuing to take his medications on schedule.    --He will continue levetiracetam unchanged.  If he would have additional seizures when compliant with medications, we could increase this further  Orders:  -     levETIRAcetam (KEPPRA) 500 mg tablet; Take 2 tablets (1,000 mg total) by mouth 2 (two) times a day      He will Return in about 1 year (around 2025).    History of Present Illness     HPI   Adriel Gonsalez is a 68 y.o. male with hypertension, hyperlipidemia, and localization related epilepsy, who is returning to Neurology office for follow up of his seizures.    Current seizure medications:  1. Levetiracetam 1000 mg twice a day   Other medications as per Epic.    Since his last visit, he continues to do well without any additional seizures. He has been compliant with his Levetiracetam and denies any side effects. He overall has been doing well and has no complaints today.     Prior Seizure Medications: none      Review of Systems    I personally reviewed the ROS that was entered by the medical assistant in a separate note    Objective     /84 (BP Location: Right arm, Patient Position: Sitting, Cuff Size: Standard)   Pulse 65   Temp 99 °F (37.2 °C) (Temporal)   Ht 6' 1\" (1.854 m)   Wt 101 kg (222 lb 4.8 oz)   BMI 29.33 kg/m²    Physical Exam  Neurologic Exam    Administrative Statements       Voice recognition software was used in the generation of this note. There may be unintentional errors including grammatical errors, spelling errors, or " pronoun errors.

## 2024-06-10 NOTE — ASSESSMENT & PLAN NOTE
He has been seizure-free since maintaining compliance with levetiracetam.  He is tolerating this well without any side effects as well.  I stressed the importance of continuing to take his medications on schedule.    --He will continue levetiracetam unchanged.  If he would have additional seizures when compliant with medications, we could increase this further

## 2024-07-11 DIAGNOSIS — I10 ESSENTIAL HYPERTENSION: ICD-10-CM

## 2024-07-11 RX ORDER — LISINOPRIL 5 MG/1
5 TABLET ORAL DAILY
Qty: 90 TABLET | Refills: 1 | Status: SHIPPED | OUTPATIENT
Start: 2024-07-11

## 2024-10-09 DIAGNOSIS — G40.109 FOCAL EPILEPSY (HCC): ICD-10-CM

## 2024-10-10 RX ORDER — LEVETIRACETAM 500 MG/1
1000 TABLET ORAL 2 TIMES DAILY
Qty: 360 TABLET | Refills: 3 | Status: SHIPPED | OUTPATIENT
Start: 2024-10-10

## 2025-01-06 DIAGNOSIS — G40.109 FOCAL EPILEPSY (HCC): ICD-10-CM

## 2025-01-06 RX ORDER — LEVETIRACETAM 500 MG/1
1000 TABLET ORAL 2 TIMES DAILY
Qty: 360 TABLET | Refills: 3 | Status: SHIPPED | OUTPATIENT
Start: 2025-01-06

## 2025-01-25 ENCOUNTER — NURSE TRIAGE (OUTPATIENT)
Dept: OTHER | Facility: OTHER | Age: 70
End: 2025-01-25

## 2025-01-25 DIAGNOSIS — U07.1 COVID-19: Primary | ICD-10-CM

## 2025-01-25 RX ORDER — NIRMATRELVIR AND RITONAVIR 300-100 MG
3 KIT ORAL 2 TIMES DAILY
Qty: 30 TABLET | Refills: 0 | Status: SHIPPED | OUTPATIENT
Start: 2025-01-25 | End: 2025-01-30

## 2025-01-25 NOTE — TELEPHONE ENCOUNTER
"Answer Assessment - Initial Assessment Questions  1. SYMPTOMS: \"What is your main symptom or concern?\" (e.g., cough, fever, shortness of breath, muscle aches)        Subjective fever, cough    2. ONSET: \"When did the symptoms start?\"         2-3 days ago  Runny nose    3. COUGH: \"Do you have a cough?\" If Yes, ask: \"How bad is the cough?\"          Dry    4. FEVER: \"Do you have a fever?\" If Yes, ask: \"What is your temperature, how was it measured, and when did it start?\"        Subjective fever    5. BREATHING DIFFICULTY: \"Are you having any difficulty breathing?\" (e.g., normal; shortness of breath, wheezing, unable to speak)         Unsure of wheezing  Denies shortness of breath    6. BETTER-SAME-WORSE: \"Are you getting better, staying the same or getting worse compared to yesterday?\"  If getting worse, ask, \"In what way?\"        Felt OK yesterday with mucinex    7. OTHER SYMPTOMS: \"Do you have any other symptoms?\"  (e.g., chills, fatigue, headache, loss of smell or taste, muscle pain, sore throat)    Sore throat  Couldn't taste sweet  fatigue    8. COVID-19 DIAGNOSIS: \"How do you know that you have COVID?\" (e.g., positive lab test or self-test, diagnosed by doctor or NP/PA, symptoms after exposure).        Home test today    9. COVID-19 EXPOSURE: \"Was there any known exposure to COVID before the symptoms began?\"         Denies    10. COVID-19 VACCINE: \"Have you had the COVID-19 vaccine?\" If Yes, ask: \"When did you last get it?\"         Denies    11. HIGH RISK DISEASE: \"Do you have any chronic medical problems?\" (e.g., asthma, heart or lung disease, weak immune system, obesity, etc.)          Denies    Protocols used: COVID-19 - Diagnosed or Suspected-Adult-AH      On call provider authorized paxlovid script.  Home care and ED precautions reviewed with patient; verbalized understanding.  "

## 2025-01-25 NOTE — TELEPHONE ENCOUNTER
Reason for Disposition  • [1] COVID-19 diagnosed by positive lab test (e.g., PCR, rapid self-test kit) AND [2] mild symptoms (e.g., cough, fever, others) AND [3] no complications or SOB    Protocols used: COVID-19 - Diagnosed or Suspected-Adult-

## 2025-02-21 ENCOUNTER — TELEPHONE (OUTPATIENT)
Dept: FAMILY MEDICINE CLINIC | Facility: CLINIC | Age: 70
End: 2025-02-21

## 2025-02-21 DIAGNOSIS — I10 ESSENTIAL HYPERTENSION: ICD-10-CM

## 2025-02-21 RX ORDER — LISINOPRIL 5 MG/1
5 TABLET ORAL DAILY
Qty: 30 TABLET | Refills: 0 | Status: SHIPPED | OUTPATIENT
Start: 2025-02-21

## 2025-02-21 NOTE — TELEPHONE ENCOUNTER
Patient called Rx refill line for refill today and advised pt he is overdue for appt- last seen 8/2023    Please contact pt to sched an appt

## 2025-02-21 NOTE — TELEPHONE ENCOUNTER
Reason for call:   [x] Refill   [] Prior Auth  [] Other:     Office:   [x] PCP/Provider -   [] Specialty/Provider -     Medication: lisinopril (ZESTRIL) 5 mg 1 tablet daily       Pharm[facility]: [facility]     Does the patient have enough for 3 days?   [x] Yes   [] No - Send as HP to POD

## 2025-03-05 ENCOUNTER — RA CDI HCC (OUTPATIENT)
Dept: OTHER | Facility: HOSPITAL | Age: 70
End: 2025-03-05

## 2025-03-11 ENCOUNTER — OFFICE VISIT (OUTPATIENT)
Dept: FAMILY MEDICINE CLINIC | Facility: CLINIC | Age: 70
End: 2025-03-11
Payer: MEDICARE

## 2025-03-11 ENCOUNTER — APPOINTMENT (OUTPATIENT)
Dept: LAB | Facility: MEDICAL CENTER | Age: 70
End: 2025-03-11
Payer: MEDICARE

## 2025-03-11 VITALS
BODY MASS INDEX: 30.66 KG/M2 | DIASTOLIC BLOOD PRESSURE: 98 MMHG | OXYGEN SATURATION: 98 % | WEIGHT: 232.4 LBS | HEART RATE: 72 BPM | SYSTOLIC BLOOD PRESSURE: 140 MMHG | TEMPERATURE: 97.1 F

## 2025-03-11 DIAGNOSIS — E11.22 TYPE 2 DIABETES MELLITUS WITH STAGE 3A CHRONIC KIDNEY DISEASE, WITHOUT LONG-TERM CURRENT USE OF INSULIN (HCC): ICD-10-CM

## 2025-03-11 DIAGNOSIS — U07.1 COVID-19: ICD-10-CM

## 2025-03-11 DIAGNOSIS — I10 ESSENTIAL HYPERTENSION: Primary | ICD-10-CM

## 2025-03-11 DIAGNOSIS — N18.31 TYPE 2 DIABETES MELLITUS WITH STAGE 3A CHRONIC KIDNEY DISEASE, WITHOUT LONG-TERM CURRENT USE OF INSULIN (HCC): ICD-10-CM

## 2025-03-11 DIAGNOSIS — N40.0 HYPERTROPHY OF PROSTATE WITHOUT URINARY OBSTRUCTION: ICD-10-CM

## 2025-03-11 DIAGNOSIS — G40.109 FOCAL EPILEPSY (HCC): ICD-10-CM

## 2025-03-11 DIAGNOSIS — Z12.5 ENCOUNTER FOR SCREENING FOR MALIGNANT NEOPLASM OF PROSTATE: ICD-10-CM

## 2025-03-11 DIAGNOSIS — Z00.00 MEDICARE ANNUAL WELLNESS VISIT, SUBSEQUENT: ICD-10-CM

## 2025-03-11 LAB
ALBUMIN SERPL BCG-MCNC: 4.3 G/DL (ref 3.5–5)
ALP SERPL-CCNC: 77 U/L (ref 34–104)
ALT SERPL W P-5'-P-CCNC: 17 U/L (ref 7–52)
ANION GAP SERPL CALCULATED.3IONS-SCNC: 9 MMOL/L (ref 4–13)
AST SERPL W P-5'-P-CCNC: 25 U/L (ref 13–39)
BILIRUB SERPL-MCNC: 0.61 MG/DL (ref 0.2–1)
BUN SERPL-MCNC: 9 MG/DL (ref 5–25)
CALCIUM SERPL-MCNC: 9.7 MG/DL (ref 8.4–10.2)
CHLORIDE SERPL-SCNC: 103 MMOL/L (ref 96–108)
CO2 SERPL-SCNC: 26 MMOL/L (ref 21–32)
CREAT SERPL-MCNC: 0.86 MG/DL (ref 0.6–1.3)
EST. AVERAGE GLUCOSE BLD GHB EST-MCNC: 103 MG/DL
GFR SERPL CREATININE-BSD FRML MDRD: 89 ML/MIN/1.73SQ M
GLUCOSE SERPL-MCNC: 96 MG/DL (ref 65–140)
HBA1C MFR BLD: 5.2 %
POTASSIUM SERPL-SCNC: 4.3 MMOL/L (ref 3.5–5.3)
PROT SERPL-MCNC: 7.4 G/DL (ref 6.4–8.4)
PSA SERPL-MCNC: 1.2 NG/ML (ref 0–4)
SODIUM SERPL-SCNC: 138 MMOL/L (ref 135–147)

## 2025-03-11 PROCEDURE — 36415 COLL VENOUS BLD VENIPUNCTURE: CPT

## 2025-03-11 PROCEDURE — G0438 PPPS, INITIAL VISIT: HCPCS | Performed by: FAMILY MEDICINE

## 2025-03-11 PROCEDURE — 80053 COMPREHEN METABOLIC PANEL: CPT | Performed by: FAMILY MEDICINE

## 2025-03-11 PROCEDURE — 83036 HEMOGLOBIN GLYCOSYLATED A1C: CPT | Performed by: FAMILY MEDICINE

## 2025-03-11 PROCEDURE — G0103 PSA SCREENING: HCPCS

## 2025-03-11 NOTE — PATIENT INSTRUCTIONS
Medicare Preventive Visit Patient Instructions  Thank you for completing your Welcome to Medicare Visit or Medicare Annual Wellness Visit today. Your next wellness visit will be due in one year (3/12/2026).  The screening/preventive services that you may require over the next 5-10 years are detailed below. Some tests may not apply to you based off risk factors and/or age. Screening tests ordered at today's visit but not completed yet may show as past due. Also, please note that scanned in results may not display below.  Preventive Screenings:  Service Recommendations Previous Testing/Comments   Colorectal Cancer Screening  Colonoscopy    Fecal Occult Blood Test (FOBT)/Fecal Immunochemical Test (FIT)  Fecal DNA/Cologuard Test  Flexible Sigmoidoscopy Age: 45-75 years old   Colonoscopy: every 10 years (May be performed more frequently if at higher risk)  OR  FOBT/FIT: every 1 year  OR  Cologuard: every 3 years  OR  Sigmoidoscopy: every 5 years  Screening may be recommended earlier than age 45 if at higher risk for colorectal cancer. Also, an individualized decision between you and your healthcare provider will decide whether screening between the ages of 76-85 would be appropriate. Colonoscopy: 09/15/2020  FOBT/FIT: Not on file  Cologuard: Not on file  Sigmoidoscopy: 12/08/2020    Screening Current     Prostate Cancer Screening Individualized decision between patient and health care provider in men between ages of 55-69   Medicare will cover every 12 months beginning on the day after your 50th birthday PSA: No results in last 5 years     Screening Not Indicated     Hepatitis C Screening Once for adults born between 1945 and 1965  More frequently in patients at high risk for Hepatitis C Hep C Antibody: Not on file    Screening Not Indicated   Diabetes Screening 1-2 times per year if you're at risk for diabetes or have pre-diabetes Fasting glucose: 96 mg/dL (1/16/2023)  A1C: No results in last 5 years (No results in last  5 years)      Cholesterol Screening Once every 5 years if you don't have a lipid disorder. May order more often based on risk factors. Lipid panel: 02/09/2022  Screening Not Indicated  History Lipid Disorder      Other Preventive Screenings Covered by Medicare:  Abdominal Aortic Aneurysm (AAA) Screening: covered once if your at risk. You're considered to be at risk if you have a family history of AAA or a male between the age of 65-75 who smoking at least 100 cigarettes in your lifetime.  Lung Cancer Screening: covers low dose CT scan once per year if you meet all of the following conditions: (1) Age 55-77; (2) No signs or symptoms of lung cancer; (3) Current smoker or have quit smoking within the last 15 years; (4) You have a tobacco smoking history of at least 20 pack years (packs per day x number of years you smoked); (5) You get a written order from a healthcare provider.  Glaucoma Screening: covered annually if you're considered high risk: (1) You have diabetes OR (2) Family history of glaucoma OR (3)  aged 50 and older OR (4)  American aged 65 and older  Osteoporosis Screening: covered every 2 years if you meet one of the following conditions: (1) Have a vertebral abnormality; (2) On glucocorticoid therapy for more than 3 months; (3) Have primary hyperparathyroidism; (4) On osteoporosis medications and need to assess response to drug therapy.  HIV Screening: covered annually if you're between the age of 15-65. Also covered annually if you are younger than 15 and older than 65 with risk factors for HIV infection. For pregnant patients, it is covered up to 3 times per pregnancy.    Immunizations:  Immunization Recommendations   Influenza Vaccine Annual influenza vaccination during flu season is recommended for all persons aged >= 6 months who do not have contraindications   Pneumococcal Vaccine   * Pneumococcal conjugate vaccine = PCV13 (Prevnar 13), PCV15 (Vaxneuvance), PCV20 (Prevnar  20)  * Pneumococcal polysaccharide vaccine = PPSV23 (Pneumovax) Adults 19-65 yo with certain risk factors or if 65+ yo  If never received any pneumonia vaccine: recommend Prevnar 20 (PCV20)  Give PCV20 if previously received 1 dose of PCV13 or PPSV23   Hepatitis B Vaccine 3 dose series if at intermediate or high risk (ex: diabetes, end stage renal disease, liver disease)   Respiratory syncytial virus (RSV) Vaccine - COVERED BY MEDICARE PART D  * RSVPreF3 (Arexvy) CDC recommends that adults 60 years of age and older may receive a single dose of RSV vaccine using shared clinical decision-making (SCDM)   Tetanus (Td) Vaccine - COST NOT COVERED BY MEDICARE PART B Following completion of primary series, a booster dose should be given every 10 years to maintain immunity against tetanus. Td may also be given as tetanus wound prophylaxis.   Tdap Vaccine - COST NOT COVERED BY MEDICARE PART B Recommended at least once for all adults. For pregnant patients, recommended with each pregnancy.   Shingles Vaccine (Shingrix) - COST NOT COVERED BY MEDICARE PART B  2 shot series recommended in those 19 years and older who have or will have weakened immune systems or those 50 years and older     Health Maintenance Due:      Topic Date Due   • Hepatitis C Screening  Never done   • Colorectal Cancer Screening  09/15/2027     Immunizations Due:      Topic Date Due   • Pneumococcal Vaccine: 65+ Years (1 of 2 - PCV) Never done   • Hepatitis A Vaccine (1 of 2 - Risk 2-dose series) Never done   • Influenza Vaccine (1) Never done   • COVID-19 Vaccine (4 - 2024-25 season) 09/01/2024     Advance Directives   What are advance directives?  Advance directives are legal documents that state your wishes and plans for medical care. These plans are made ahead of time in case you lose your ability to make decisions for yourself. Advance directives can apply to any medical decision, such as the treatments you want, and if you want to donate organs.    What are the types of advance directives?  There are many types of advance directives, and each state has rules about how to use them. You may choose a combination of any of the following:  Living will:  This is a written record of the treatment you want. You can also choose which treatments you do not want, which to limit, and which to stop at a certain time. This includes surgery, medicine, IV fluid, and tube feedings.   Durable power of  for healthcare (DPAHC):  This is a written record that states who you want to make healthcare choices for you when you are unable to make them for yourself. This person, called a proxy, is usually a family member or a friend. You may choose more than 1 proxy.  Do not resuscitate (DNR) order:  A DNR order is used in case your heart stops beating or you stop breathing. It is a request not to have certain forms of treatment, such as CPR. A DNR order may be included in other types of advance directives.  Medical directive:  This covers the care that you want if you are in a coma, near death, or unable to make decisions for yourself. You can list the treatments you want for each condition. Treatment may include pain medicine, surgery, blood transfusions, dialysis, IV or tube feedings, and a ventilator (breathing machine).  Values history:  This document has questions about your views, beliefs, and how you feel and think about life. This information can help others choose the care that you would choose.  Why are advance directives important?  An advance directive helps you control your care. Although spoken wishes may be used, it is better to have your wishes written down. Spoken wishes can be misunderstood, or not followed. Treatments may be given even if you do not want them. An advance directive may make it easier for your family to make difficult choices about your care.   Cigarette Smoking and Your Health   Risks to your health if you smoke:  Nicotine and other chemicals  found in tobacco damage every cell in your body. Even if you are a light smoker, you have an increased risk for cancer, heart disease, and lung disease. If you are pregnant or have diabetes, smoking increases your risk for complications.   Benefits to your health if you stop smoking:   You decrease respiratory symptoms such as coughing, wheezing, and shortness of breath.   You reduce your risk for cancers of the lung, mouth, throat, kidney, bladder, pancreas, stomach, and cervix. If you already have cancer, you increase the benefits of chemotherapy. You also reduce your risk for cancer returning or a second cancer from developing.   You reduce your risk for heart disease, blood clots, heart attack, and stroke.   You reduce your risk for lung infections, and diseases such as pneumonia, asthma, chronic bronchitis, and emphysema.  Your circulation improves. More oxygen can be delivered to your body. If you have diabetes, you lower your risk for complications, such as kidney, artery, and eye diseases. You also lower your risk for nerve damage. Nerve damage can lead to amputations, poor vision, and blindness.  You improve your body's ability to heal and to fight infections.  For more information and support to stop smoking:   [email or web address]  Phone: 4- 029 - [phone]  Web Address: www.[employer]  Weight Management   Why it is important to manage your weight:  Being overweight increases your risk of health conditions such as heart disease, high blood pressure, type 2 diabetes, and certain types of cancer. It can also increase your risk for osteoarthritis, sleep apnea, and other respiratory problems. Aim for a slow, steady weight loss. Even a small amount of weight loss can lower your risk of health problems.  How to lose weight safely:  A safe and healthy way to lose weight is to eat fewer calories and get regular exercise. You can lose up about 1 pound a week by decreasing the number of calories you eat by 500 calories  each day.   Healthy meal plan for weight management:  A healthy meal plan includes a variety of foods, contains fewer calories, and helps you stay healthy. A healthy meal plan includes the following:  Eat whole-grain foods more often.  A healthy meal plan should contain fiber. Fiber is the part of grains, fruits, and vegetables that is not broken down by your body. Whole-grain foods are healthy and provide extra fiber in your diet. Some examples of whole-grain foods are whole-wheat breads and pastas, oatmeal, brown rice, and bulgur.  Eat a variety of vegetables every day.  Include dark, leafy greens such as spinach, kale, solitario greens, and mustard greens. Eat yellow and orange vegetables such as carrots, sweet potatoes, and winter squash.   Eat a variety of fruits every day.  Choose fresh or canned fruit (canned in its own juice or light syrup) instead of juice. Fruit juice has very little or no fiber.  Eat low-fat dairy foods.  Drink fat-free (skim) milk or 1% milk. Eat fat-free yogurt and low-fat cottage cheese. Try low-fat cheeses such as mozzarella and other reduced-fat cheeses.  Choose meat and other protein foods that are low in fat.  Choose beans or other legumes such as split peas or lentils. Choose fish, skinless poultry (chicken or turkey), or lean cuts of red meat (beef or pork). Before you cook meat or poultry, cut off any visible fat.   Use less fat and oil.  Try baking foods instead of frying them. Add less fat, such as margarine, sour cream, regular salad dressing and mayonnaise to foods. Eat fewer high-fat foods. Some examples of high-fat foods include french fries, doughnuts, ice cream, and cakes.  Eat fewer sweets.  Limit foods and drinks that are high in sugar. This includes candy, cookies, regular soda, and sweetened drinks.  Exercise:  Exercise at least 30 minutes per day on most days of the week. Some examples of exercise include walking, biking, dancing, and swimming. You can also fit in  more physical activity by taking the stairs instead of the elevator or parking farther away from stores. Ask your healthcare provider about the best exercise plan for you.      © Copyright [employer] 2018 Information is for End User's use only and may not be sold, redistributed or otherwise used for commercial purposes. All illustrations and images included in CareNotes® are the copyrighted property of [employer] or [employer]

## 2025-03-11 NOTE — PROGRESS NOTES
"Name: Adriel Gonsalez      : 1955      MRN: [id number]  Encounter Provider: Jonas Mitchell DO  Encounter Date: 3/11/2025   Encounter department: [facility]    Assessment & Plan  COVID-19         Essential hypertension         Medicare annual wellness visit, subsequent         Hypertrophy of prostate without urinary obstruction         Type 2 diabetes mellitus with stage 3a chronic kidney disease, without long-term current use of insulin (HCC)    No results found for: \"HGBA1C\"           Depression Screening and Follow-up Plan: Patient was screened for depression during today's encounter. They screened negative with a PHQ-2 score of 0.      Tobacco Cessation Counseling: Tobacco cessation counseling was not provided. The patient is sincerely urged to quit consumption of tobacco. He is not ready to quit tobacco. Medication options and side effects of medication not discussed. Patient agreed to medication.       Preventive health issues were discussed with patient, and age appropriate screening tests were ordered as noted in patient's After Visit Summary. Personalized health advice and appropriate referrals for health education or preventive services given if needed, as noted in patient's After Visit Summary.    History of Present Illness     Mr. Gonsalez is here today for the purposes of a Medicare subsequent well visit in addition to this earlier this month he had COVID and recovered using Paxlovid he feels well now       Patient Care Team:  Jonas Mitchell DO as PCP - General (Family Medicine)  Mis Atkinson PA-C as PCP - PCP-[facility] Attributed-Roster    Review of Systems   Constitutional:  Negative for chills and fever.   HENT:  Negative for ear pain and sore throat.    Eyes:  Negative for pain and visual disturbance.   Respiratory:  Positive for cough. Negative for shortness of breath.    Cardiovascular:  Negative for chest pain and palpitations.   Gastrointestinal:  Negative for " abdominal pain and vomiting.   Genitourinary:  Negative for dysuria and hematuria.   Musculoskeletal:  Positive for arthralgias. Negative for back pain.   Skin:  Negative for color change and rash.   Neurological:  Negative for seizures and syncope.   All other systems reviewed and are negative.    Medical History Reviewed by provider this encounter:       Annual Wellness Visit Questionnaire   Adriel is here for his Subsequent Wellness visit. Last Medicare Wellness visit information reviewed, patient interviewed and updates made to the record today.      Health Risk Assessment:   Patient rates overall health as very good. Patient feels that their physical health rating is slightly better. Patient is satisfied with their life. Eyesight was rated as slightly worse. Hearing was rated as same. Patient feels that their emotional and mental health rating is same. Patients states they are never, rarely angry. Patient states they are never, rarely unusually tired/fatigued. Pain experienced in the last 7 days has been some. Patient's pain rating has been 2/10. Patient states that he has experienced no weight loss or gain in last 6 months.     Depression Screening:   PHQ-2 Score: 0      Fall Risk Screening:   In the past year, patient has experienced: no history of falling in past year      Home Safety:  Patient does not have trouble with stairs inside or outside of their home. Patient has working smoke alarms and has working carbon monoxide detector. Home safety hazards include: none.     Medications:   Patient is not currently taking any over-the-counter supplements. Patient is able to manage medications.     Activities of Daily Living (ADLs)/Instrumental Activities of Daily Living (IADLs):   Walk and transfer into and out of bed and chair?: Yes  Dress and groom yourself?: Yes    Bathe or shower yourself?: Yes    Feed yourself? Yes  Do your laundry/housekeeping?: Yes  Manage your money, pay your bills and track your  expenses?: Yes  Make your own meals?: Yes    Do your own shopping?: Yes    Previous Hospitalizations:   Any hospitalizations or ED visits within the last 12 months?: No      Advance Care Planning:   Living will: No    Durable POA for healthcare: No    Advanced directive: Yes    Advanced directive counseling given: Yes    ACP document given: Yes    Patient declined ACP directive: No    End of Life Decisions reviewed with patient: No    Provider agrees with end of life decisions: No      Cognitive Screening:   Provider or family/friend/caregiver concerned regarding cognition?: Yes  Mini-Mental Status Exam (MMSE) Score: 18  Interpretation: MMSE Score 10-19: Moderate dementia    PREVENTIVE SCREENINGS      Cardiovascular Screening:    General: Screening Not Indicated and History Lipid Disorder      Colorectal Cancer Screening:     General: Screening Current      Prostate Cancer Screening:    General: Screening Not Indicated      Osteoporosis Screening:    General: Screening Not Indicated      Abdominal Aortic Aneurysm (AAA) Screening:    Risk factors include: age between 65-74 yo and tobacco use        Lung Cancer Screening:     General: Screening Not Indicated      Hepatitis C Screening:    General: Screening Not Indicated    Screening, Brief Intervention, and Referral to Treatment (SBIRT)     Screening  Typical number of drinks in a day: 2  Typical number of drinks in a week: 24  Interpretation: Low risk drinking behavior.    Social Drivers of Health     Financial Resource Strain: Low Risk  (8/23/2023)    [facility]    Overall Financial Resource Strain (CARDIA)     Difficulty of Paying Living Expenses: Not very hard   Food Insecurity: No Food Insecurity (3/11/2025)    Hunger Vital Sign     Worried About Running Out of Food in the Last Year: Never true     Ran Out of Food in the Last Year: Never true   Transportation Needs: No Transportation Needs (3/11/2025)     PRAPARE - Transportation     Lack of Transportation (Medical): No     Lack of Transportation (Non-Medical): No   Housing Stability: Unknown (3/11/2025)    Housing Stability Vital Sign     Unable to Pay for Housing in the Last Year: No     Homeless in the Last Year: No   Utilities: Not At Risk (3/11/2025)    [facility] Utilities     Threatened with loss of utilities: No     No results found.    Objective   /98   Pulse 72   Temp (!) 97.1 °F (36.2 °C)   Wt 105 kg (232 lb 6.4 oz)   SpO2 98%   BMI 30.66 kg/m²     Physical Exam  Constitutional:       Appearance: He is well-developed.   HENT:      Head: Normocephalic and atraumatic.      Right Ear: External ear normal.      Left Ear: External ear normal.      Nose: Nose normal.   Eyes:      Conjunctiva/sclera: Conjunctivae normal.      Pupils: Pupils are equal, round, and reactive to light.   Cardiovascular:      Rate and Rhythm: Normal rate and regular rhythm.      Heart sounds: Normal heart sounds. No murmur heard.     No friction rub.   Pulmonary:      Effort: Pulmonary effort is normal. No respiratory distress.      Breath sounds: Normal breath sounds. No wheezing or rales.   Chest:      Chest wall: No tenderness.   Abdominal:      General: Bowel sounds are normal.      Palpations: Abdomen is soft.   Musculoskeletal:         General: Normal range of motion.      Cervical back: Normal range of motion and neck supple.   Skin:     General: Skin is warm and dry.      Capillary Refill: Capillary refill takes 2 to 3 seconds.   Neurological:      General: No focal deficit present.      Mental Status: He is alert and oriented to person, place, and time.   Psychiatric:         Behavior: Behavior normal.         Thought Content: Thought content normal.         Judgment: Judgment normal.

## 2025-03-12 ENCOUNTER — RESULTS FOLLOW-UP (OUTPATIENT)
Dept: FAMILY MEDICINE CLINIC | Facility: CLINIC | Age: 70
End: 2025-03-12

## 2025-04-07 DIAGNOSIS — I10 ESSENTIAL HYPERTENSION: ICD-10-CM

## 2025-04-07 RX ORDER — LISINOPRIL 5 MG/1
5 TABLET ORAL DAILY
Qty: 30 TABLET | Refills: 0 | Status: SHIPPED | OUTPATIENT
Start: 2025-04-07

## 2025-05-14 DIAGNOSIS — I10 ESSENTIAL HYPERTENSION: ICD-10-CM

## 2025-05-14 RX ORDER — LISINOPRIL 5 MG/1
5 TABLET ORAL DAILY
Qty: 30 TABLET | Refills: 0 | Status: SHIPPED | OUTPATIENT
Start: 2025-05-14

## 2025-06-04 ENCOUNTER — OFFICE VISIT (OUTPATIENT)
Dept: NEUROLOGY | Facility: CLINIC | Age: 70
End: 2025-06-04
Payer: MEDICARE

## 2025-06-04 VITALS
BODY MASS INDEX: 28.63 KG/M2 | DIASTOLIC BLOOD PRESSURE: 82 MMHG | HEART RATE: 76 BPM | WEIGHT: 217 LBS | SYSTOLIC BLOOD PRESSURE: 130 MMHG | OXYGEN SATURATION: 98 %

## 2025-06-04 DIAGNOSIS — J30.2 SEASONAL ALLERGIES: ICD-10-CM

## 2025-06-04 DIAGNOSIS — G40.109 FOCAL EPILEPSY (HCC): Primary | ICD-10-CM

## 2025-06-04 PROCEDURE — G2211 COMPLEX E/M VISIT ADD ON: HCPCS | Performed by: PSYCHIATRY & NEUROLOGY

## 2025-06-04 PROCEDURE — 99214 OFFICE O/P EST MOD 30 MIN: CPT | Performed by: PSYCHIATRY & NEUROLOGY

## 2025-06-04 RX ORDER — LEVETIRACETAM 500 MG/1
1000 TABLET ORAL 2 TIMES DAILY
Qty: 360 TABLET | Refills: 3 | Status: SHIPPED | OUTPATIENT
Start: 2025-06-04

## 2025-06-04 RX ORDER — DIPHENHYDRAMINE HCL 25 MG
25 TABLET ORAL AS NEEDED
COMMUNITY

## 2025-06-04 NOTE — ASSESSMENT & PLAN NOTE
He has continued to be seizure free and has been doing well without any side effects. We discussed the importance of continuing Levetiracetam unchanged going forward.     -- he will continue Levetiracetam unchanged. If he would have any additional seizures, his dose could be increased.     Orders:    levETIRAcetam (KEPPRA) 500 mg tablet; Take 2 tablets (1,000 mg total) by mouth 2 (two) times a day

## 2025-06-04 NOTE — PROGRESS NOTES
Name: Adriel Gonsalez      : 1955      MRN: [id number]  Encounter Provider: Gen Zavala MD  Encounter Date: 2025   Encounter department: NEUROLOGY [facility]  :  Assessment & Plan  Focal epilepsy (HCC)  He has continued to be seizure free and has been doing well without any side effects. We discussed the importance of continuing Levetiracetam unchanged going forward.     -- he will continue Levetiracetam unchanged. If he would have any additional seizures, his dose could be increased.     Orders:    levETIRAcetam (KEPPRA) 500 mg tablet; Take 2 tablets (1,000 mg total) by mouth 2 (two) times a day    Seasonal allergies  He has been experiencing a lot of issues with seasonal allergies. We discussed that he can try taking medications such as loratadine or cetirizine daily if needed for seasonal allergies.            He will Return in about 1 year (around 2026).      History of Present Illness   HPI  Adriel Gonsalez is a 69 y.o. male with  hypertension, hyperlipidemia, and localization related epilepsy, who is returning to Neurology office for follow up of his seizures.     Current seizure medications:  1. Levetiracetam 1000 mg twice a day   Other medications as per Epic.    Since his last visit, he has been doing well and has been compliant with his Levetiracetam. He denies any other seizures and has overall been doing really well.     He has been having issues with seasonal allergies. He has chest congestion and a runny nose.     Prior Seizure Medications: none    Review of Systems  I personally reviewed the review of systems that was entered by the medical assistant in a separate note       Objective   /82 (BP Location: Left arm, Patient Position: Sitting, Cuff Size: Large)   Pulse 76   Wt 98.4 kg (217 lb)   SpO2 98%   BMI 28.63 kg/m²      Physical Exam    Voice recognition software was used in the generation of this note. There may be unintentional errors including  grammatical errors, spelling errors, or pronoun errors.  :

## 2025-06-04 NOTE — PATIENT INSTRUCTIONS
-- continue to take Levetiracetam 1000 mg twice a day     --you can use over the counter allergy medications such as Loratadine (Claritin) or Cetirizine (Zyrtec).

## 2025-06-26 DIAGNOSIS — I10 ESSENTIAL HYPERTENSION: ICD-10-CM

## 2025-06-26 RX ORDER — LISINOPRIL 5 MG/1
5 TABLET ORAL DAILY
Qty: 90 TABLET | Refills: 1 | Status: SHIPPED | OUTPATIENT
Start: 2025-06-26

## 2025-06-26 NOTE — TELEPHONE ENCOUNTER
Reason for call:   [x] Refill   [] Prior Auth  [] Other:     Office:   [x] PCP/Provider -   [] Specialty/Provider -     Medication:     lisinopril (ZESTRIL) 5 mg Take 1 tablet (5 mg total) by mouth daily          Pharmacy: [facility]     Local Pharmacy   Does the patient have enough for 3 days?   [] Yes   [x] No - Send as HP to POD    Mail Away Pharmacy   Does the patient have enough for 10 days?   [] Yes   [] No - Send as HP to POD

## (undated) DEVICE — SYRINGE 5ML LL

## (undated) DEVICE — NEEDLE 25G X 1 1/2

## (undated) DEVICE — IV EXTENSION TUBING 33 IN

## (undated) DEVICE — CHLORAPREP HI-LITE 10.5ML ORANGE

## (undated) DEVICE — GLOVE SRG BIOGEL 8

## (undated) DEVICE — NEEDLE SPINAL 22G X 3.5IN  QUINCKE

## (undated) DEVICE — NEEDLE 18 G X 1 1/2

## (undated) DEVICE — SYRINGE 10ML LL

## (undated) DEVICE — PLASTIC ADHESIVE BANDAGE: Brand: CURITY

## (undated) DEVICE — SYRINGE 3ML LL